# Patient Record
Sex: MALE | Race: WHITE | NOT HISPANIC OR LATINO | Employment: OTHER | URBAN - METROPOLITAN AREA
[De-identification: names, ages, dates, MRNs, and addresses within clinical notes are randomized per-mention and may not be internally consistent; named-entity substitution may affect disease eponyms.]

---

## 2023-08-23 LAB
ANION GAP IN SER/PLAS: 12 MMOL/L (ref 10–20)
CALCIUM (MG/DL) IN SER/PLAS: 9.3 MG/DL (ref 8.6–10.3)
CARBON DIOXIDE, TOTAL (MMOL/L) IN SER/PLAS: 29 MMOL/L (ref 21–32)
CHLORIDE (MMOL/L) IN SER/PLAS: 104 MMOL/L (ref 98–107)
CREATININE (MG/DL) IN SER/PLAS: 0.83 MG/DL (ref 0.5–1.3)
ERYTHROCYTE DISTRIBUTION WIDTH (RATIO) BY AUTOMATED COUNT: 12.2 % (ref 11.5–14.5)
ERYTHROCYTE MEAN CORPUSCULAR HEMOGLOBIN CONCENTRATION (G/DL) BY AUTOMATED: 34.4 G/DL (ref 32–36)
ERYTHROCYTE MEAN CORPUSCULAR VOLUME (FL) BY AUTOMATED COUNT: 94 FL (ref 80–100)
ERYTHROCYTES (10*6/UL) IN BLOOD BY AUTOMATED COUNT: 5.13 X10E12/L (ref 4.5–5.9)
GFR MALE: >90 ML/MIN/1.73M2
GLUCOSE (MG/DL) IN SER/PLAS: 77 MG/DL (ref 74–99)
HEMATOCRIT (%) IN BLOOD BY AUTOMATED COUNT: 48.3 % (ref 41–52)
HEMOGLOBIN (G/DL) IN BLOOD: 16.6 G/DL (ref 13.5–17.5)
LEUKOCYTES (10*3/UL) IN BLOOD BY AUTOMATED COUNT: 5.5 X10E9/L (ref 4.4–11.3)
PLATELETS (10*3/UL) IN BLOOD AUTOMATED COUNT: 151 X10E9/L (ref 150–450)
POTASSIUM (MMOL/L) IN SER/PLAS: 4.4 MMOL/L (ref 3.5–5.3)
SODIUM (MMOL/L) IN SER/PLAS: 141 MMOL/L (ref 136–145)
UREA NITROGEN (MG/DL) IN SER/PLAS: 16 MG/DL (ref 6–23)

## 2024-03-28 ENCOUNTER — OFFICE VISIT (OUTPATIENT)
Dept: CARDIOLOGY | Facility: CLINIC | Age: 74
End: 2024-03-28
Payer: MEDICARE

## 2024-03-28 VITALS
SYSTOLIC BLOOD PRESSURE: 142 MMHG | HEART RATE: 80 BPM | BODY MASS INDEX: 35.98 KG/M2 | DIASTOLIC BLOOD PRESSURE: 72 MMHG | WEIGHT: 258 LBS

## 2024-03-28 RX ORDER — MELOXICAM 15 MG/1
15 TABLET ORAL
COMMUNITY
Start: 2024-03-26

## 2024-03-28 RX ORDER — CARVEDILOL 3.12 MG/1
3.12 TABLET ORAL
COMMUNITY
Start: 2023-05-07

## 2024-03-28 RX ORDER — APIXABAN 5 MG/1
5 TABLET, FILM COATED ORAL 2 TIMES DAILY
COMMUNITY
Start: 2023-08-22

## 2024-03-28 RX ORDER — ALLOPURINOL 100 MG/1
200 TABLET ORAL DAILY
COMMUNITY
Start: 2024-03-22

## 2024-03-29 PROBLEM — J31.0 CHRONIC RHINITIS: Status: ACTIVE | Noted: 2017-06-27

## 2024-03-29 PROBLEM — E55.9 VITAMIN D DEFICIENCY: Status: ACTIVE | Noted: 2017-06-27

## 2024-03-29 PROBLEM — E66.811 CLASS 1 OBESITY: Status: ACTIVE | Noted: 2023-05-07

## 2024-03-29 PROBLEM — I82.412 ACUTE DEEP VEIN THROMBOSIS (DVT) OF FEMORAL VEIN OF LEFT LOWER EXTREMITY (MULTI): Status: ACTIVE | Noted: 2023-05-31

## 2024-03-29 PROBLEM — I26.09 ACUTE PULMONARY EMBOLISM WITH ACUTE COR PULMONALE (MULTI): Status: ACTIVE | Noted: 2023-05-31

## 2024-03-29 PROBLEM — I21.A1 MYOCARDIAL INFARCTION TYPE 2 (MULTI): Status: ACTIVE | Noted: 2023-05-07

## 2024-03-29 PROBLEM — E78.1 HYPERTRIGLYCERIDEMIA: Status: ACTIVE | Noted: 2017-06-27

## 2024-03-29 PROBLEM — E66.9 CLASS 1 OBESITY: Status: ACTIVE | Noted: 2023-05-07

## 2024-03-29 PROBLEM — F52.9 SEXUAL FUNCTION PROBLEM: Status: ACTIVE | Noted: 2017-06-27

## 2024-03-29 PROBLEM — D69.6 THROMBOCYTOPENIA, UNSPECIFIED (CMS-HCC): Status: ACTIVE | Noted: 2023-05-07

## 2024-03-29 PROBLEM — M75.112 PARTIAL TEAR OF LEFT ROTATOR CUFF: Status: ACTIVE | Noted: 2018-02-19

## 2024-03-29 PROBLEM — J40 BRONCHITIS: Status: ACTIVE | Noted: 2017-06-27

## 2024-09-17 DIAGNOSIS — I21.A1 MYOCARDIAL INFARCTION TYPE 2 (MULTI): Primary | ICD-10-CM

## 2024-09-19 ENCOUNTER — TELEPHONE (OUTPATIENT)
Dept: CARDIOLOGY | Facility: HOSPITAL | Age: 74
End: 2024-09-19
Payer: MEDICARE

## 2024-09-19 NOTE — TELEPHONE ENCOUNTER
Received a task that patient needs a yearly follow up with Dr. Ramos, got voicemail and left voicemail to call us back in regards to getting an appointment.

## 2024-09-23 ENCOUNTER — TELEPHONE (OUTPATIENT)
Dept: CARDIOLOGY | Facility: HOSPITAL | Age: 74
End: 2024-09-23
Payer: MEDICARE

## 2024-09-23 DIAGNOSIS — E78.1 HYPERTRIGLYCERIDEMIA: ICD-10-CM

## 2024-09-23 DIAGNOSIS — I21.A1 MYOCARDIAL INFARCTION TYPE 2 (MULTI): Primary | ICD-10-CM

## 2024-09-23 NOTE — TELEPHONE ENCOUNTER
RN called patient regarding lab work. Lab orders place, RN left message with patient.          ----- Message from Sameera Hermosillo sent at 9/19/2024  1:10 PM EDT -----  Catherine, I received refill request for apixaban.  No CBC or BMP since Aug 2023 - needs repeat labs.  I will send 30 days.  Please order lab work and notify patient (also needs f/u scheduled).    Gutierrez, last seen by Dr. Geronimo Ramos Aug 2023 and no follow up scheduled.  He needs f/u appt.

## 2025-05-23 ENCOUNTER — HOSPITAL ENCOUNTER (INPATIENT)
Facility: HOSPITAL | Age: 75
LOS: 2 days | Discharge: HOME | DRG: 322 | End: 2025-05-25
Attending: EMERGENCY MEDICINE | Admitting: INTERNAL MEDICINE
Payer: MEDICARE

## 2025-05-23 ENCOUNTER — APPOINTMENT (OUTPATIENT)
Dept: CARDIOLOGY | Facility: HOSPITAL | Age: 75
DRG: 322 | End: 2025-05-23
Payer: MEDICARE

## 2025-05-23 ENCOUNTER — APPOINTMENT (OUTPATIENT)
Dept: RADIOLOGY | Facility: HOSPITAL | Age: 75
DRG: 322 | End: 2025-05-23
Payer: MEDICARE

## 2025-05-23 ENCOUNTER — CLINICAL SUPPORT (OUTPATIENT)
Dept: EMERGENCY MEDICINE | Facility: HOSPITAL | Age: 75
DRG: 322 | End: 2025-05-23
Payer: MEDICARE

## 2025-05-23 DIAGNOSIS — I21.4 NSTEMI (NON-ST ELEVATED MYOCARDIAL INFARCTION) (MULTI): Primary | ICD-10-CM

## 2025-05-23 DIAGNOSIS — R79.89 OTHER SPECIFIED ABNORMAL FINDINGS OF BLOOD CHEMISTRY: ICD-10-CM

## 2025-05-23 DIAGNOSIS — M79.89 LEG SWELLING: ICD-10-CM

## 2025-05-23 DIAGNOSIS — R07.9 CHEST PAIN, UNSPECIFIED: ICD-10-CM

## 2025-05-23 DIAGNOSIS — I50.21 ACUTE SYSTOLIC HEART FAILURE: ICD-10-CM

## 2025-05-23 DIAGNOSIS — R06.02 SHORTNESS OF BREATH: ICD-10-CM

## 2025-05-23 LAB
ABO GROUP (TYPE) IN BLOOD: NORMAL
ALBUMIN SERPL BCP-MCNC: 3.7 G/DL (ref 3.4–5)
ALBUMIN SERPL BCP-MCNC: 4.2 G/DL (ref 3.4–5)
ALP SERPL-CCNC: 58 U/L (ref 33–136)
ALT SERPL W P-5'-P-CCNC: 32 U/L (ref 10–52)
ANION GAP SERPL CALC-SCNC: 14 MMOL/L (ref 10–20)
ANION GAP SERPL CALC-SCNC: 16 MMOL/L (ref 10–20)
ANTIBODY SCREEN: NORMAL
AORTIC VALVE MEAN GRADIENT: 7 MMHG
AORTIC VALVE PEAK VELOCITY: 1.72 M/S
APTT PPP: 33 SECONDS (ref 26–36)
APTT PPP: >200 SECONDS (ref 26–36)
AST SERPL W P-5'-P-CCNC: 52 U/L (ref 9–39)
ATRIAL RATE: 90 BPM
ATRIAL RATE: 91 BPM
AV PEAK GRADIENT: 12 MMHG
AVA (PEAK VEL): 2.16 CM2
AVA (VTI): 1.49 CM2
BASOPHILS # BLD AUTO: 0.05 X10*3/UL (ref 0–0.1)
BASOPHILS # BLD AUTO: 0.06 X10*3/UL (ref 0–0.1)
BASOPHILS NFR BLD AUTO: 0.6 %
BASOPHILS NFR BLD AUTO: 0.7 %
BILIRUB SERPL-MCNC: 1.2 MG/DL (ref 0–1.2)
BNP SERPL-MCNC: 142 PG/ML (ref 0–99)
BUN SERPL-MCNC: 16 MG/DL (ref 6–23)
BUN SERPL-MCNC: 17 MG/DL (ref 6–23)
CALCIUM SERPL-MCNC: 8.6 MG/DL (ref 8.6–10.6)
CALCIUM SERPL-MCNC: 9.2 MG/DL (ref 8.6–10.6)
CARDIAC TROPONIN I PNL SERPL HS: 7904 NG/L (ref 0–53)
CARDIAC TROPONIN I PNL SERPL HS: 8378 NG/L (ref 0–53)
CHLORIDE SERPL-SCNC: 101 MMOL/L (ref 98–107)
CHLORIDE SERPL-SCNC: 102 MMOL/L (ref 98–107)
CO2 SERPL-SCNC: 24 MMOL/L (ref 21–32)
CO2 SERPL-SCNC: 24 MMOL/L (ref 21–32)
CREAT SERPL-MCNC: 1 MG/DL (ref 0.5–1.3)
CREAT SERPL-MCNC: 1.13 MG/DL (ref 0.5–1.3)
D DIMER PPP FEU-MCNC: 897 NG/ML FEU
EGFRCR SERPLBLD CKD-EPI 2021: 68 ML/MIN/1.73M*2
EGFRCR SERPLBLD CKD-EPI 2021: 78 ML/MIN/1.73M*2
EJECTION FRACTION APICAL 4 CHAMBER: 31.4
EJECTION FRACTION: 33 %
EOSINOPHIL # BLD AUTO: 0.04 X10*3/UL (ref 0–0.4)
EOSINOPHIL # BLD AUTO: 0.05 X10*3/UL (ref 0–0.4)
EOSINOPHIL NFR BLD AUTO: 0.5 %
EOSINOPHIL NFR BLD AUTO: 0.6 %
ERYTHROCYTE [DISTWIDTH] IN BLOOD BY AUTOMATED COUNT: 11.4 % (ref 11.5–14.5)
ERYTHROCYTE [DISTWIDTH] IN BLOOD BY AUTOMATED COUNT: 11.7 % (ref 11.5–14.5)
GLUCOSE SERPL-MCNC: 77 MG/DL (ref 74–99)
GLUCOSE SERPL-MCNC: 90 MG/DL (ref 74–99)
HCT VFR BLD AUTO: 42.6 % (ref 41–52)
HCT VFR BLD AUTO: 45.1 % (ref 41–52)
HGB BLD-MCNC: 15.1 G/DL (ref 13.5–17.5)
HGB BLD-MCNC: 16.1 G/DL (ref 13.5–17.5)
IMM GRANULOCYTES # BLD AUTO: 0.04 X10*3/UL (ref 0–0.5)
IMM GRANULOCYTES # BLD AUTO: 0.08 X10*3/UL (ref 0–0.5)
IMM GRANULOCYTES NFR BLD AUTO: 0.5 % (ref 0–0.9)
IMM GRANULOCYTES NFR BLD AUTO: 1 % (ref 0–0.9)
INR PPP: 1.5 (ref 0.9–1.1)
LACTATE SERPL-SCNC: 1.5 MMOL/L (ref 0.4–2)
LEFT ATRIUM VOLUME AREA LENGTH INDEX BSA: 16 ML/M2
LEFT VENTRICLE INTERNAL DIMENSION DIASTOLE: 5.27 CM (ref 3.5–6)
LEFT VENTRICULAR OUTFLOW TRACT DIAMETER: 2.24 CM
LYMPHOCYTES # BLD AUTO: 1.89 X10*3/UL (ref 0.8–3)
LYMPHOCYTES # BLD AUTO: 2.3 X10*3/UL (ref 0.8–3)
LYMPHOCYTES NFR BLD AUTO: 22.5 %
LYMPHOCYTES NFR BLD AUTO: 28.8 %
MAGNESIUM SERPL-MCNC: 1.95 MG/DL (ref 1.6–2.4)
MAGNESIUM SERPL-MCNC: 2.19 MG/DL (ref 1.6–2.4)
MCH RBC QN AUTO: 32.2 PG (ref 26–34)
MCH RBC QN AUTO: 32.8 PG (ref 26–34)
MCHC RBC AUTO-ENTMCNC: 35.4 G/DL (ref 32–36)
MCHC RBC AUTO-ENTMCNC: 35.7 G/DL (ref 32–36)
MCV RBC AUTO: 90 FL (ref 80–100)
MCV RBC AUTO: 93 FL (ref 80–100)
MITRAL VALVE E/A RATIO: 0.67
MONOCYTES # BLD AUTO: 0.74 X10*3/UL (ref 0.05–0.8)
MONOCYTES # BLD AUTO: 0.8 X10*3/UL (ref 0.05–0.8)
MONOCYTES NFR BLD AUTO: 9.3 %
MONOCYTES NFR BLD AUTO: 9.5 %
NEUTROPHILS # BLD AUTO: 4.78 X10*3/UL (ref 1.6–5.5)
NEUTROPHILS # BLD AUTO: 5.58 X10*3/UL (ref 1.6–5.5)
NEUTROPHILS NFR BLD AUTO: 59.7 %
NEUTROPHILS NFR BLD AUTO: 66.3 %
NRBC BLD-RTO: 0 /100 WBCS (ref 0–0)
NRBC BLD-RTO: 0 /100 WBCS (ref 0–0)
P AXIS: 39 DEGREES
P AXIS: 49 DEGREES
P OFFSET: 180 MS
P OFFSET: 185 MS
P ONSET: 138 MS
P ONSET: 142 MS
PHOSPHATE SERPL-MCNC: 3.4 MG/DL (ref 2.5–4.9)
PLATELET # BLD AUTO: 170 X10*3/UL (ref 150–450)
PLATELET # BLD AUTO: 170 X10*3/UL (ref 150–450)
POTASSIUM SERPL-SCNC: 4 MMOL/L (ref 3.5–5.3)
POTASSIUM SERPL-SCNC: 4.3 MMOL/L (ref 3.5–5.3)
PR INTERVAL: 144 MS
PR INTERVAL: 150 MS
PROT SERPL-MCNC: 7.2 G/DL (ref 6.4–8.2)
PROTHROMBIN TIME: 16.6 SECONDS (ref 9.8–12.4)
Q ONSET: 213 MS
Q ONSET: 214 MS
QRS COUNT: 15 BEATS
QRS COUNT: 15 BEATS
QRS DURATION: 88 MS
QRS DURATION: 88 MS
QT INTERVAL: 360 MS
QT INTERVAL: 370 MS
QTC CALCULATION(BAZETT): 440 MS
QTC CALCULATION(BAZETT): 455 MS
QTC FREDERICIA: 412 MS
QTC FREDERICIA: 425 MS
R AXIS: -7 DEGREES
R AXIS: 6 DEGREES
RBC # BLD AUTO: 4.6 X10*6/UL (ref 4.5–5.9)
RBC # BLD AUTO: 5 X10*6/UL (ref 4.5–5.9)
RH FACTOR (ANTIGEN D): NORMAL
RIGHT VENTRICLE FREE WALL PEAK S': 7 CM/S
RIGHT VENTRICLE PEAK SYSTOLIC PRESSURE: 23.9 MMHG
SODIUM SERPL-SCNC: 136 MMOL/L (ref 136–145)
SODIUM SERPL-SCNC: 137 MMOL/L (ref 136–145)
T AXIS: -2 DEGREES
T AXIS: -30 DEGREES
T OFFSET: 393 MS
T OFFSET: 399 MS
TRICUSPID ANNULAR PLANE SYSTOLIC EXCURSION: 1.8 CM
UFH PPP CHRO-ACNC: 0.7 IU/ML (ref ?–1.1)
UFH PPP CHRO-ACNC: 1.8 IU/ML (ref ?–1.1)
VENTRICULAR RATE: 90 BPM
VENTRICULAR RATE: 91 BPM
WBC # BLD AUTO: 8 X10*3/UL (ref 4.4–11.3)
WBC # BLD AUTO: 8.4 X10*3/UL (ref 4.4–11.3)

## 2025-05-23 PROCEDURE — C1894 INTRO/SHEATH, NON-LASER: HCPCS | Performed by: INTERNAL MEDICINE

## 2025-05-23 PROCEDURE — 2500000004 HC RX 250 GENERAL PHARMACY W/ HCPCS (ALT 636 FOR OP/ED): Mod: JZ

## 2025-05-23 PROCEDURE — 93458 L HRT ARTERY/VENTRICLE ANGIO: CPT | Performed by: INTERNAL MEDICINE

## 2025-05-23 PROCEDURE — 93308 TTE F-UP OR LMTD: CPT | Performed by: EMERGENCY MEDICINE

## 2025-05-23 PROCEDURE — 2500000004 HC RX 250 GENERAL PHARMACY W/ HCPCS (ALT 636 FOR OP/ED)

## 2025-05-23 PROCEDURE — 02703DZ DILATION OF CORONARY ARTERY, ONE ARTERY WITH INTRALUMINAL DEVICE, PERCUTANEOUS APPROACH: ICD-10-PCS | Performed by: INTERNAL MEDICINE

## 2025-05-23 PROCEDURE — 027035Z DILATION OF CORONARY ARTERY, ONE ARTERY WITH TWO DRUG-ELUTING INTRALUMINAL DEVICES, PERCUTANEOUS APPROACH: ICD-10-PCS | Performed by: INTERNAL MEDICINE

## 2025-05-23 PROCEDURE — 83605 ASSAY OF LACTIC ACID: CPT

## 2025-05-23 PROCEDURE — 92978 ENDOLUMINL IVUS OCT C 1ST: CPT | Performed by: INTERNAL MEDICINE

## 2025-05-23 PROCEDURE — 86901 BLOOD TYPING SEROLOGIC RH(D): CPT

## 2025-05-23 PROCEDURE — 83735 ASSAY OF MAGNESIUM: CPT

## 2025-05-23 PROCEDURE — 85730 THROMBOPLASTIN TIME PARTIAL: CPT

## 2025-05-23 PROCEDURE — 99285 EMERGENCY DEPT VISIT HI MDM: CPT | Performed by: EMERGENCY MEDICINE

## 2025-05-23 PROCEDURE — C1887 CATHETER, GUIDING: HCPCS | Performed by: INTERNAL MEDICINE

## 2025-05-23 PROCEDURE — 99285 EMERGENCY DEPT VISIT HI MDM: CPT | Mod: 25 | Performed by: EMERGENCY MEDICINE

## 2025-05-23 PROCEDURE — 2500000002 HC RX 250 W HCPCS SELF ADMINISTERED DRUGS (ALT 637 FOR MEDICARE OP, ALT 636 FOR OP/ED): Performed by: INTERNAL MEDICINE

## 2025-05-23 PROCEDURE — 74018 RADEX ABDOMEN 1 VIEW: CPT

## 2025-05-23 PROCEDURE — 80053 COMPREHEN METABOLIC PANEL: CPT

## 2025-05-23 PROCEDURE — 85520 HEPARIN ASSAY: CPT

## 2025-05-23 PROCEDURE — 85025 COMPLETE CBC W/AUTO DIFF WBC: CPT

## 2025-05-23 PROCEDURE — 85610 PROTHROMBIN TIME: CPT

## 2025-05-23 PROCEDURE — 85347 COAGULATION TIME ACTIVATED: CPT | Performed by: INTERNAL MEDICINE

## 2025-05-23 PROCEDURE — C1874 STENT, COATED/COV W/DEL SYS: HCPCS | Performed by: INTERNAL MEDICINE

## 2025-05-23 PROCEDURE — 93306 TTE W/DOPPLER COMPLETE: CPT | Performed by: INTERNAL MEDICINE

## 2025-05-23 PROCEDURE — 92928 PRQ TCAT PLMT NTRAC ST 1 LES: CPT | Performed by: INTERNAL MEDICINE

## 2025-05-23 PROCEDURE — 4A023N7 MEASUREMENT OF CARDIAC SAMPLING AND PRESSURE, LEFT HEART, PERCUTANEOUS APPROACH: ICD-10-PCS | Performed by: INTERNAL MEDICINE

## 2025-05-23 PROCEDURE — C1760 CLOSURE DEV, VASC: HCPCS | Performed by: INTERNAL MEDICINE

## 2025-05-23 PROCEDURE — 83880 ASSAY OF NATRIURETIC PEPTIDE: CPT

## 2025-05-23 PROCEDURE — 2720000007 HC OR 272 NO HCPCS: Performed by: INTERNAL MEDICINE

## 2025-05-23 PROCEDURE — 85379 FIBRIN DEGRADATION QUANT: CPT

## 2025-05-23 PROCEDURE — 80069 RENAL FUNCTION PANEL: CPT | Mod: CCI

## 2025-05-23 PROCEDURE — 71046 X-RAY EXAM CHEST 2 VIEWS: CPT | Performed by: RADIOLOGY

## 2025-05-23 PROCEDURE — 2780000003 HC OR 278 NO HCPCS: Performed by: INTERNAL MEDICINE

## 2025-05-23 PROCEDURE — 93005 ELECTROCARDIOGRAM TRACING: CPT

## 2025-05-23 PROCEDURE — 74018 RADEX ABDOMEN 1 VIEW: CPT | Performed by: RADIOLOGY

## 2025-05-23 PROCEDURE — 2500000001 HC RX 250 WO HCPCS SELF ADMINISTERED DRUGS (ALT 637 FOR MEDICARE OP)

## 2025-05-23 PROCEDURE — 99223 1ST HOSP IP/OBS HIGH 75: CPT

## 2025-05-23 PROCEDURE — 84484 ASSAY OF TROPONIN QUANT: CPT

## 2025-05-23 PROCEDURE — 36415 COLL VENOUS BLD VENIPUNCTURE: CPT

## 2025-05-23 PROCEDURE — 2500000002 HC RX 250 W HCPCS SELF ADMINISTERED DRUGS (ALT 637 FOR MEDICARE OP, ALT 636 FOR OP/ED)

## 2025-05-23 PROCEDURE — 92920 PRQ TRLUML C ANGIOP 1ART&/BR: CPT | Performed by: INTERNAL MEDICINE

## 2025-05-23 PROCEDURE — 71046 X-RAY EXAM CHEST 2 VIEWS: CPT

## 2025-05-23 PROCEDURE — C1725 CATH, TRANSLUMIN NON-LASER: HCPCS | Performed by: INTERNAL MEDICINE

## 2025-05-23 PROCEDURE — B2111ZZ FLUOROSCOPY OF MULTIPLE CORONARY ARTERIES USING LOW OSMOLAR CONTRAST: ICD-10-PCS | Performed by: INTERNAL MEDICINE

## 2025-05-23 PROCEDURE — 93010 ELECTROCARDIOGRAM REPORT: CPT | Performed by: EMERGENCY MEDICINE

## 2025-05-23 PROCEDURE — 99152 MOD SED SAME PHYS/QHP 5/>YRS: CPT | Performed by: INTERNAL MEDICINE

## 2025-05-23 PROCEDURE — 2550000001 HC RX 255 CONTRASTS: Performed by: INTERNAL MEDICINE

## 2025-05-23 PROCEDURE — 96374 THER/PROPH/DIAG INJ IV PUSH: CPT | Mod: 59

## 2025-05-23 PROCEDURE — 2500000004 HC RX 250 GENERAL PHARMACY W/ HCPCS (ALT 636 FOR OP/ED): Performed by: INTERNAL MEDICINE

## 2025-05-23 PROCEDURE — C1769 GUIDE WIRE: HCPCS | Performed by: INTERNAL MEDICINE

## 2025-05-23 PROCEDURE — 99153 MOD SED SAME PHYS/QHP EA: CPT | Performed by: INTERNAL MEDICINE

## 2025-05-23 PROCEDURE — C1753 CATH, INTRAVAS ULTRASOUND: HCPCS | Performed by: INTERNAL MEDICINE

## 2025-05-23 PROCEDURE — 2020000001 HC ICU ROOM DAILY

## 2025-05-23 PROCEDURE — 93306 TTE W/DOPPLER COMPLETE: CPT

## 2025-05-23 DEVICE — IMPLANTABLE DEVICE: Type: IMPLANTABLE DEVICE | Site: CORONARY | Status: FUNCTIONAL

## 2025-05-23 DEVICE — EVEROLIMUS-ELUTING PLATINUM CHROMIUM CORONARY STENT SYSTEM
Type: IMPLANTABLE DEVICE | Site: CORONARY | Status: FUNCTIONAL
Brand: SYNERGY™ XD

## 2025-05-23 RX ORDER — TICAGRELOR 90 MG/1
90 TABLET, FILM COATED ORAL 2 TIMES DAILY
Status: DISCONTINUED | OUTPATIENT
Start: 2025-05-23 | End: 2025-05-25 | Stop reason: HOSPADM

## 2025-05-23 RX ORDER — HEPARIN SODIUM 1000 [USP'U]/ML
INJECTION, SOLUTION INTRAVENOUS; SUBCUTANEOUS AS NEEDED
Status: DISCONTINUED | OUTPATIENT
Start: 2025-05-23 | End: 2025-05-23 | Stop reason: HOSPADM

## 2025-05-23 RX ORDER — FENTANYL CITRATE 50 UG/ML
INJECTION, SOLUTION INTRAMUSCULAR; INTRAVENOUS AS NEEDED
Status: DISCONTINUED | OUTPATIENT
Start: 2025-05-23 | End: 2025-05-23 | Stop reason: HOSPADM

## 2025-05-23 RX ORDER — TICAGRELOR 90 MG/1
TABLET, FILM COATED ORAL AS NEEDED
Status: DISCONTINUED | OUTPATIENT
Start: 2025-05-23 | End: 2025-05-23 | Stop reason: HOSPADM

## 2025-05-23 RX ORDER — MIDAZOLAM HYDROCHLORIDE 1 MG/ML
INJECTION, SOLUTION INTRAMUSCULAR; INTRAVENOUS AS NEEDED
Status: DISCONTINUED | OUTPATIENT
Start: 2025-05-23 | End: 2025-05-23 | Stop reason: HOSPADM

## 2025-05-23 RX ORDER — LIDOCAINE HYDROCHLORIDE 20 MG/ML
INJECTION, SOLUTION INFILTRATION; PERINEURAL AS NEEDED
Status: DISCONTINUED | OUTPATIENT
Start: 2025-05-23 | End: 2025-05-23 | Stop reason: HOSPADM

## 2025-05-23 RX ORDER — HEPARIN SODIUM 10000 [USP'U]/100ML
0-4000 INJECTION, SOLUTION INTRAVENOUS CONTINUOUS
Status: DISCONTINUED | OUTPATIENT
Start: 2025-05-23 | End: 2025-05-24

## 2025-05-23 RX ORDER — VERAPAMIL HYDROCHLORIDE 2.5 MG/ML
INJECTION INTRAVENOUS AS NEEDED
Status: DISCONTINUED | OUTPATIENT
Start: 2025-05-23 | End: 2025-05-23 | Stop reason: HOSPADM

## 2025-05-23 RX ORDER — NAPROXEN SODIUM 220 MG/1
325 TABLET, FILM COATED ORAL DAILY
Status: DISCONTINUED | OUTPATIENT
Start: 2025-05-23 | End: 2025-05-23

## 2025-05-23 RX ORDER — ONDANSETRON HYDROCHLORIDE 2 MG/ML
4 INJECTION, SOLUTION INTRAVENOUS ONCE
Status: COMPLETED | OUTPATIENT
Start: 2025-05-23 | End: 2025-05-23

## 2025-05-23 RX ORDER — ONDANSETRON HYDROCHLORIDE 2 MG/ML
INJECTION, SOLUTION INTRAVENOUS
Status: COMPLETED
Start: 2025-05-23 | End: 2025-05-23

## 2025-05-23 RX ORDER — PROCHLORPERAZINE EDISYLATE 5 MG/ML
10 INJECTION INTRAMUSCULAR; INTRAVENOUS ONCE
Status: COMPLETED | OUTPATIENT
Start: 2025-05-23 | End: 2025-05-23

## 2025-05-23 RX ORDER — NAPROXEN SODIUM 220 MG/1
81 TABLET, FILM COATED ORAL DAILY
Status: DISCONTINUED | OUTPATIENT
Start: 2025-05-24 | End: 2025-05-25 | Stop reason: HOSPADM

## 2025-05-23 RX ORDER — NITROGLYCERIN 40 MG/100ML
INJECTION INTRAVENOUS AS NEEDED
Status: DISCONTINUED | OUTPATIENT
Start: 2025-05-23 | End: 2025-05-23 | Stop reason: HOSPADM

## 2025-05-23 RX ORDER — ATORVASTATIN CALCIUM 40 MG/1
40 TABLET, FILM COATED ORAL NIGHTLY
Status: DISCONTINUED | OUTPATIENT
Start: 2025-05-23 | End: 2025-05-25

## 2025-05-23 RX ADMIN — TICAGRELOR 90 MG: 90 TABLET ORAL at 22:00

## 2025-05-23 RX ADMIN — HEPARIN SODIUM 1000 UNITS/HR: 10000 INJECTION, SOLUTION INTRAVENOUS at 12:24

## 2025-05-23 RX ADMIN — HEPARIN SODIUM 1000 UNITS/HR: 10000 INJECTION, SOLUTION INTRAVENOUS at 23:36

## 2025-05-23 RX ADMIN — ONDANSETRON HYDROCHLORIDE 4 MG: 2 INJECTION, SOLUTION INTRAVENOUS at 18:35

## 2025-05-23 RX ADMIN — PROCHLORPERAZINE EDISYLATE 10 MG: 5 INJECTION INTRAMUSCULAR; INTRAVENOUS at 20:47

## 2025-05-23 RX ADMIN — ASPIRIN 324 MG: 81 TABLET, CHEWABLE ORAL at 11:49

## 2025-05-23 RX ADMIN — ONDANSETRON 4 MG: 2 INJECTION INTRAMUSCULAR; INTRAVENOUS at 18:35

## 2025-05-23 RX ADMIN — ATORVASTATIN CALCIUM 40 MG: 40 TABLET, FILM COATED ORAL at 22:00

## 2025-05-23 SDOH — SOCIAL STABILITY: SOCIAL INSECURITY: ABUSE: ADULT

## 2025-05-23 SDOH — ECONOMIC STABILITY: HOUSING INSECURITY: IN THE LAST 12 MONTHS, WAS THERE A TIME WHEN YOU WERE NOT ABLE TO PAY THE MORTGAGE OR RENT ON TIME?: NO

## 2025-05-23 SDOH — ECONOMIC STABILITY: TRANSPORTATION INSECURITY: IN THE PAST 12 MONTHS, HAS LACK OF TRANSPORTATION KEPT YOU FROM MEDICAL APPOINTMENTS OR FROM GETTING MEDICATIONS?: NO

## 2025-05-23 SDOH — ECONOMIC STABILITY: FOOD INSECURITY: WITHIN THE PAST 12 MONTHS, THE FOOD YOU BOUGHT JUST DIDN'T LAST AND YOU DIDN'T HAVE MONEY TO GET MORE.: NEVER TRUE

## 2025-05-23 SDOH — SOCIAL STABILITY: SOCIAL INSECURITY
WITHIN THE LAST YEAR, HAVE YOU BEEN KICKED, HIT, SLAPPED, OR OTHERWISE PHYSICALLY HURT BY YOUR PARTNER OR EX-PARTNER?: NO

## 2025-05-23 SDOH — SOCIAL STABILITY: SOCIAL INSECURITY: WITHIN THE LAST YEAR, HAVE YOU BEEN HUMILIATED OR EMOTIONALLY ABUSED IN OTHER WAYS BY YOUR PARTNER OR EX-PARTNER?: NO

## 2025-05-23 SDOH — ECONOMIC STABILITY: FOOD INSECURITY: WITHIN THE PAST 12 MONTHS, YOU WORRIED THAT YOUR FOOD WOULD RUN OUT BEFORE YOU GOT THE MONEY TO BUY MORE.: NEVER TRUE

## 2025-05-23 SDOH — ECONOMIC STABILITY: INCOME INSECURITY: IN THE PAST 12 MONTHS HAS THE ELECTRIC, GAS, OIL, OR WATER COMPANY THREATENED TO SHUT OFF SERVICES IN YOUR HOME?: NO

## 2025-05-23 SDOH — SOCIAL STABILITY: SOCIAL INSECURITY: WITHIN THE LAST YEAR, HAVE YOU BEEN AFRAID OF YOUR PARTNER OR EX-PARTNER?: NO

## 2025-05-23 SDOH — ECONOMIC STABILITY: HOUSING INSECURITY: AT ANY TIME IN THE PAST 12 MONTHS, WERE YOU HOMELESS OR LIVING IN A SHELTER (INCLUDING NOW)?: NO

## 2025-05-23 SDOH — SOCIAL STABILITY: SOCIAL INSECURITY: WERE YOU ABLE TO COMPLETE ALL THE BEHAVIORAL HEALTH SCREENINGS?: YES

## 2025-05-23 SDOH — SOCIAL STABILITY: SOCIAL INSECURITY: HAVE YOU HAD THOUGHTS OF HARMING ANYONE ELSE?: NO

## 2025-05-23 SDOH — SOCIAL STABILITY: SOCIAL INSECURITY
WITHIN THE LAST YEAR, HAVE YOU BEEN RAPED OR FORCED TO HAVE ANY KIND OF SEXUAL ACTIVITY BY YOUR PARTNER OR EX-PARTNER?: NO

## 2025-05-23 SDOH — SOCIAL STABILITY: SOCIAL INSECURITY: DO YOU FEEL ANYONE HAS EXPLOITED OR TAKEN ADVANTAGE OF YOU FINANCIALLY OR OF YOUR PERSONAL PROPERTY?: NO

## 2025-05-23 SDOH — SOCIAL STABILITY: SOCIAL INSECURITY: ARE YOU OR HAVE YOU BEEN THREATENED OR ABUSED PHYSICALLY, EMOTIONALLY, OR SEXUALLY BY ANYONE?: NO

## 2025-05-23 SDOH — SOCIAL STABILITY: SOCIAL INSECURITY: DOES ANYONE TRY TO KEEP YOU FROM HAVING/CONTACTING OTHER FRIENDS OR DOING THINGS OUTSIDE YOUR HOME?: NO

## 2025-05-23 SDOH — SOCIAL STABILITY: SOCIAL INSECURITY: HAS ANYONE EVER THREATENED TO HURT YOUR FAMILY OR YOUR PETS?: NO

## 2025-05-23 SDOH — ECONOMIC STABILITY: FOOD INSECURITY: HOW HARD IS IT FOR YOU TO PAY FOR THE VERY BASICS LIKE FOOD, HOUSING, MEDICAL CARE, AND HEATING?: NOT HARD AT ALL

## 2025-05-23 SDOH — SOCIAL STABILITY: SOCIAL INSECURITY: HAVE YOU HAD ANY THOUGHTS OF HARMING ANYONE ELSE?: NO

## 2025-05-23 SDOH — SOCIAL STABILITY: SOCIAL INSECURITY: DO YOU FEEL UNSAFE GOING BACK TO THE PLACE WHERE YOU ARE LIVING?: NO

## 2025-05-23 SDOH — SOCIAL STABILITY: SOCIAL INSECURITY: ARE THERE ANY APPARENT SIGNS OF INJURIES/BEHAVIORS THAT COULD BE RELATED TO ABUSE/NEGLECT?: NO

## 2025-05-23 ASSESSMENT — ENCOUNTER SYMPTOMS
FATIGUE: 1
SHORTNESS OF BREATH: 1
WOUND: 0
HEADACHES: 0
SINUS PAIN: 0
COUGH: 1
HEMATURIA: 0
LIGHT-HEADEDNESS: 0
DYSURIA: 0
UNEXPECTED WEIGHT CHANGE: 0
DIZZINESS: 0
CONSTIPATION: 0
RHINORRHEA: 0
DIARRHEA: 0
ABDOMINAL PAIN: 0

## 2025-05-23 ASSESSMENT — ACTIVITIES OF DAILY LIVING (ADL)
FEEDING YOURSELF: INDEPENDENT
WALKS IN HOME: INDEPENDENT
HEARING - LEFT EAR: FUNCTIONAL
HEARING - RIGHT EAR: FUNCTIONAL
DRESSING YOURSELF: INDEPENDENT
LACK_OF_TRANSPORTATION: NO
BATHING: INDEPENDENT
JUDGMENT_ADEQUATE_SAFELY_COMPLETE_DAILY_ACTIVITIES: YES
GROOMING: INDEPENDENT
ADEQUATE_TO_COMPLETE_ADL: YES
TOILETING: INDEPENDENT
PATIENT'S MEMORY ADEQUATE TO SAFELY COMPLETE DAILY ACTIVITIES?: YES

## 2025-05-23 ASSESSMENT — LIFESTYLE VARIABLES
HOW MANY STANDARD DRINKS CONTAINING ALCOHOL DO YOU HAVE ON A TYPICAL DAY: PATIENT DOES NOT DRINK
HOW OFTEN DO YOU HAVE A DRINK CONTAINING ALCOHOL: NEVER
TOTAL SCORE: 0
SUBSTANCE_ABUSE_PAST_12_MONTHS: NO
PRESCIPTION_ABUSE_PAST_12_MONTHS: NO
HAVE PEOPLE ANNOYED YOU BY CRITICIZING YOUR DRINKING: NO
EVER FELT BAD OR GUILTY ABOUT YOUR DRINKING: NO
HOW OFTEN DO YOU HAVE 6 OR MORE DRINKS ON ONE OCCASION: NEVER
EVER HAD A DRINK FIRST THING IN THE MORNING TO STEADY YOUR NERVES TO GET RID OF A HANGOVER: NO
SKIP TO QUESTIONS 9-10: 1
HAVE YOU EVER FELT YOU SHOULD CUT DOWN ON YOUR DRINKING: NO
AUDIT-C TOTAL SCORE: 0
AUDIT-C TOTAL SCORE: 0

## 2025-05-23 ASSESSMENT — COGNITIVE AND FUNCTIONAL STATUS - GENERAL
DAILY ACTIVITIY SCORE: 24
WALKING IN HOSPITAL ROOM: A LITTLE
MOBILITY SCORE: 22
CLIMB 3 TO 5 STEPS WITH RAILING: A LITTLE
PATIENT BASELINE BEDBOUND: NO

## 2025-05-23 ASSESSMENT — PAIN - FUNCTIONAL ASSESSMENT
PAIN_FUNCTIONAL_ASSESSMENT: 0-10

## 2025-05-23 ASSESSMENT — COLUMBIA-SUICIDE SEVERITY RATING SCALE - C-SSRS
1. IN THE PAST MONTH, HAVE YOU WISHED YOU WERE DEAD OR WISHED YOU COULD GO TO SLEEP AND NOT WAKE UP?: NO
6. HAVE YOU EVER DONE ANYTHING, STARTED TO DO ANYTHING, OR PREPARED TO DO ANYTHING TO END YOUR LIFE?: NO
2. HAVE YOU ACTUALLY HAD ANY THOUGHTS OF KILLING YOURSELF?: NO

## 2025-05-23 ASSESSMENT — PAIN DESCRIPTION - LOCATION: LOCATION: CHEST

## 2025-05-23 ASSESSMENT — PAIN SCALES - GENERAL
PAINLEVEL_OUTOF10: 0 - NO PAIN
PAINLEVEL_OUTOF10: 8
PAINLEVEL_OUTOF10: 0 - NO PAIN
PAINLEVEL_OUTOF10: 6

## 2025-05-23 ASSESSMENT — PATIENT HEALTH QUESTIONNAIRE - PHQ9
SUM OF ALL RESPONSES TO PHQ9 QUESTIONS 1 & 2: 0
2. FEELING DOWN, DEPRESSED OR HOPELESS: NOT AT ALL
1. LITTLE INTEREST OR PLEASURE IN DOING THINGS: NOT AT ALL

## 2025-05-23 ASSESSMENT — PAIN DESCRIPTION - DESCRIPTORS: DESCRIPTORS: ACHING

## 2025-05-23 NOTE — Clinical Note
Angioplasty of the proximal right coronary artery lesion. Inflation 1: Pressure = 16 sal; Duration = 13 sec.

## 2025-05-23 NOTE — Clinical Note
Diagnostic wire inserted. pt arrive by ambulance with sudden onset of left sided CP and heaviness that began after drinking water and choking on it. EMS administer 1 nitro SL, ASA 324mg.

## 2025-05-23 NOTE — POST-PROCEDURE NOTE
Physician Transition of Care Summary  Invasive Cardiovascular Lab    Procedure Date: 5/23/2025  Attending:    * Luis Hurst - Primary  Resident/Fellow/Other Assistant: Surgeons and Role:     * Renny Yepez MD - Fellow    Indications:   Pre-op Diagnosis      * NSTEMI (non-ST elevated myocardial infarction) (Multi) [I21.4]    Post-procedure diagnosis:   Post-op Diagnosis     * NSTEMI (non-ST elevated myocardial infarction) (Multi) [I21.4]    Procedure(s):   [facility], With LV  [id number] - [place] CATH PLMT L HRT & ARTS W/NJX & ANGIO IMG S&I        Procedure Findings:     Coronary Angiography:  The coronary circulation is right dominant.     Left Main Coronary Artery:   The left main coronary artery is a normal caliber vessel. The left main arises normally from the left coronary sinus of Valsalva and bifurcates into the LAD and circumflex coronary arteries. The left main coronary artery showed no significant disease or stenosis greater than 30%.     Left Anterior Descending Coronary Artery Distribution:   The left anterior descending coronary artery is a normal caliber vessel. The LAD arises normally from the left main coronary artery. The LAD demonstrated no significant disease or stenosis greater than 30%. The 1st diagonal branch is a large caliber vessel. The 1st diagonal branch showed severe atherosclerotic disease. There is a 90% stenosis of in a branch of the diagonal.     Circumflex Coronary Artery Distribution:   The circumflex coronary artery is a normal caliber vessel. The circumflex arises normally from the left main coronary artery and terminates in the AV groove. The circumflex revealed mild luminal irregularities and mild atherosclerotic disease. The 1st obtuse marginal branch is a normal caliber vessel. The 1st obtuse marginal branch showed no significant disease or stenosis greater than 30%.     Right Coronary Artery Distribution:   The right coronary artery is a normal caliber vessel. The RCA arises normally from  the right sinus of Valsalva. The RCA showed total thrombotic occlusion originating at the proximal to mid segment.     Coronary Interventions:  Angiography reveals a 100% stenosis of the proximal to mid right coronary artery coronary artery. Pre-intervention MARISOL flow was 0. Intravascular Ultrasound (IVUS) was performed within the proximal to mid right coronary artery. Percutaneous coronary intervention was performed within the proximal to mid right coronary artery. The vessel was pre-dilated using a compliant balloon 2.5 mm x 20 mm at 8 YVONNE. synergy Xd 4.0 mm x 48 mm was advanced to the lesion and implanted at 18 YVONNE. A second Synergy Xd 4.5 mm x 12 mm was implanted in overlap at 18 YVONNE. The stenosis was successfully reduced from 100% to 0%. Post-intervention MARISOL flow was 3.     Description of the Procedure:   After infiltration with 2% Lidocaine, the right radial artery was cannulated with a modified Seldinger technique. Subsequently a 6 Monegasque sheath was placed in the right radial artery. Multiple injections of contrast were made into the left and right coronary arteries with angiograms recorded in multiple projections. After completion of the procedure, the arterial sheath was pulled and a TR Band Radial Compression Device was utilized to obtain patent hemostasis.       Complications:   None    Stents/Implants:   Implants          Stent          Stent, Synergy Xd, Mr Us, 4.00 X 48mm - [id number] - Implanted        Inventory item: STENT, SYNERGY XD, MR US, 4.00 X 48MM Model/Cat number: [id number]    : [employer] Lot number: [id number]    Device identifier: [id number]        GUDID Information       Request status Successful        Brand name: SYNERGY™ XD Version/Model: [id number]    Company name: [employer] MRI safety info as of 5/23/25: MR Sunny    Contains dry or latex rubber: No      GMDN P.T. name: Drug-eluting coronary artery stent,  non-bioabsorbable-polymer-coated                As of 5/23/2025       Status: Implanted                        Stent, Synergy XMr seb Us, 4.50 X 12mm - [id number] - Implanted        Inventory item: STENT, SYNERGY XMR SANDY BRIONES, 4.50 X 12MM Model/Cat number: [id number]    : Delivered Lot number: [id number]      As of 5/23/2025       Status: Implanted                              Anticoagulation/Antiplatelet Plan:   DAPT (ASA and brilinta) and heparin gtt    Estimated Blood Loss:   10 mL    Anesthesia: Moderate Sedation Anesthesia Staff: No anesthesia staff entered.    Any Specimen(s) Removed:   No specimens collected during this procedure.    Disposition:   CICU      Electronically signed by: Renny Yepez MD, 5/23/2025 5:55 PM

## 2025-05-23 NOTE — Clinical Note
Angioplasty of the right coronary artery lesion. Inflation 1: Pressure = 8 sal; Duration = 5 sec. Inflation 2: Pressure = 8 sal; Duration = 10 sec. Inflation 3: Pressure = 8 sal; Duration = 10 sec. Inflation 4: Pressure = 8 sal; Duration = 15 sec.

## 2025-05-23 NOTE — Clinical Note
Angioplasty of the proximal right coronary artery lesion. Inflation 1: Pressure = 18 sal; Duration = 30 sec. Inflation 2: Pressure = 20 sal; Duration = 33 sec.

## 2025-05-23 NOTE — H&P
History Of Present Illness prior to [facility]   Tenzin Bates is a 74 y.o. 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     Patient reports symptoms started when his wife was undergoing medical treatment of her own last week; patient has been under a substantial amount of stress. Chest pain began last Saturday and has gradually gotten worse and has been constant. Pain is pressure like and worse with deep breathing and coughing. Patient reports these symptoms are the exact same as when he was diagnosed with pulmonary emboli previously; of note he has not been on AC for ~8 weeks. He has associated SOB when chest pressure occurs but exertion does not worsen the pain. Reports chest pain has improved since coming to the hospital but cannot pinpoint anything specifically that has improved the pain.     TTE done today shows drop in EF to 30-35% from 45-50%. Troponin peak to 8378 with subsequent downtrend.      Past Medical History  Medical History[1]    Surgical History  Surgical History[2]     Social History  He reports that he has never smoked. He has never used smokeless tobacco. He reports that he does not drink alcohol and does not use drugs.    Family History  Family History[3]     Allergies  Patient has no known allergies.    Review of Systems   Constitutional:  Positive for fatigue. Negative for unexpected weight change.   HENT:  Negative for congestion, rhinorrhea and sinus pain.    Eyes:  Negative for visual disturbance.   Respiratory:  Positive for cough and shortness of breath.    Cardiovascular:  Positive for chest pain and leg swelling.   Gastrointestinal:  Negative for abdominal pain, constipation and diarrhea.   Genitourinary:  Negative for dysuria and hematuria.   Skin:  Negative for rash and wound.   Neurological:  Negative for dizziness, light-headedness and headaches.        +unsteadiness         Physical  "Exam  Constitutional:       Appearance: Normal appearance.   Cardiovascular:      Rate and Rhythm: Normal rate and regular rhythm.   Pulmonary:      Effort: Pulmonary effort is normal.   Abdominal:      Palpations: Abdomen is soft.      Tenderness: There is no abdominal tenderness.   Musculoskeletal:      Right lower leg: Edema present.      Left lower leg: Edema present.      Comments: 1-2+ BLE edema    Skin:     General: Skin is warm and dry.   Neurological:      General: No focal deficit present.      Mental Status: He is alert and oriented to person, place, and time.          Last Recorded Vitals  Blood pressure 112/78, pulse 88, temperature 36.4 °C (97.5 °F), temperature source Temporal, resp. rate 18, height 1.803 m (5' 11\"), weight 117 kg (258 lb), SpO2 96%.    Relevant Results          Results for orders placed or performed during the hospital encounter of 05/23/25 (from the past 96 hours)   CBC and Auto Differential   Result Value Ref Range    WBC 8.4 4.4 - 11.3 x10*3/uL    nRBC 0.0 0.0 - 0.0 /100 WBCs    RBC 5.00 4.50 - 5.90 x10*6/uL    Hemoglobin 16.1 13.5 - 17.5 g/dL    Hematocrit 45.1 41.0 - 52.0 %    MCV 90 80 - 100 fL    MCH 32.2 26.0 - 34.0 pg    MCHC 35.7 32.0 - 36.0 g/dL    RDW 11.7 11.5 - 14.5 %    Platelets 170 150 - 450 x10*3/uL    Neutrophils % 66.3 40.0 - 80.0 %    Immature Granulocytes %, Automated 0.5 0.0 - 0.9 %    Lymphocytes % 22.5 13.0 - 44.0 %    Monocytes % 9.5 2.0 - 10.0 %    Eosinophils % 0.5 0.0 - 6.0 %    Basophils % 0.7 0.0 - 2.0 %    Neutrophils Absolute 5.58 (H) 1.60 - 5.50 x10*3/uL    Immature Granulocytes Absolute, Automated 0.04 0.00 - 0.50 x10*3/uL    Lymphocytes Absolute 1.89 0.80 - 3.00 x10*3/uL    Monocytes Absolute 0.80 0.05 - 0.80 x10*3/uL    Eosinophils Absolute 0.04 0.00 - 0.40 x10*3/uL    Basophils Absolute 0.06 0.00 - 0.10 x10*3/uL   Comprehensive metabolic panel   Result Value Ref Range    Glucose 90 74 - 99 mg/dL    Sodium 136 136 - 145 mmol/L    Potassium 4.3 3.5 " - 5.3 mmol/L    Chloride 102 98 - 107 mmol/L    Bicarbonate 24 21 - 32 mmol/L    Anion Gap 14 10 - 20 mmol/L    Urea Nitrogen 17 6 - 23 mg/dL    Creatinine 1.13 0.50 - 1.30 mg/dL    eGFR 68 >60 mL/min/1.73m*2    Calcium 9.2 8.6 - 10.6 mg/dL    Albumin 4.2 3.4 - 5.0 g/dL    Alkaline Phosphatase 58 33 - 136 U/L    Total Protein 7.2 6.4 - 8.2 g/dL    AST 52 (H) 9 - 39 U/L    Bilirubin, Total 1.2 0.0 - 1.2 mg/dL    ALT 32 10 - 52 U/L   Magnesium   Result Value Ref Range    Magnesium 2.19 1.60 - 2.40 mg/dL   B-Type Natriuretic Peptide   Result Value Ref Range     (H) 0 - 99 pg/mL   Troponin I, High Sensitivity, Initial   Result Value Ref Range    Troponin I, High Sensitivity (CMC) 8,378 (HH) 0 - 53 ng/L   Troponin, High Sensitivity, 1 Hour   Result Value Ref Range    Troponin I, High Sensitivity (CMC) 7,904 (HH) 0 - 53 ng/L   D-dimer, quantitative   Result Value Ref Range    D-Dimer Non VTE, Quant (ng/mL FEU) 897 (H) <=500 ng/mL FEU   aPTT - baseline   Result Value Ref Range    aPTT 33 26 - 36 seconds   Heparin Assay, UFH   Result Value Ref Range    Heparin Unfractionated 0.7 See Comment Below for Therapeutic Ranges IU/mL   Transthoracic Echo Complete   Result Value Ref Range    AV pk harrison 1.72 m/s    AV mn grad 7 mmHg    LVOT diam 2.24 cm    MV E/A ratio 0.67     LA vol index A/L 16.0 ml/m2    Tricuspid annular plane systolic excursion 1.8 cm    LV EF 33 %    RV free wall pk S' 7.00 cm/s    LVIDd 5.27 cm    RVSP 23.9 mmHg    Aortic Valve Area by Continuity of VTI 1.49 cm2    Aortic Valve Area by Continuity of Peak Velocity 2.16 cm2    AV pk grad 12 mmHg    LV A4C EF 31.4      Scheduled medications  Scheduled Medications[4]  Continuous medications  Continuous Medications[5]  PRN medications  PRN Medications[6]    Scheduled Medications[7]       Assessment & Plan  NSTEMI (non-ST elevated myocardial infarction) (Multi)      Tenzin Bates is a 74 y.o. 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     Plan  C for NSTEMI with Dr Hurst   - loaded with ASA  - cont heparin gtt   - no need to trend troponin     I spent 60 minutes in the professional and overall care of this patient.      Veronika Bauer, APRN-CNP         [1] History reviewed. No pertinent past medical history.  [2] History reviewed. No pertinent surgical history.  [3] No family history on file.  [4] aspirin, 324 mg, oral, Daily  perflutren protein A microsphere, 0.5 mL, intravenous, Once in imaging  [5] heparin, 0-4,000 Units/hr, Last Rate: 1,000 Units/hr (05/23/25 1224)  [6] PRN medications: heparin  [7] aspirin, 324 mg, oral, Daily  perflutren protein A microsphere, 0.5 mL, intravenous, Once in imaging

## 2025-05-23 NOTE — ED PROVIDER NOTES
History of Present Illness     History provided by: Patient  Limitations to History: None    HPI:  Tnezin Bates is a 74 y.o. male presenting with shortness of breath, chest pain and leg swelling for the past week.  Patient has a past medical history of a cardiomyopathy, sees cardiology at [facility].  He takes Lasix as needed for leg swelling, however patient states he has not taken Lasix in the past month.  He has a prior history of DVT and PE in  he has been off Eliquis for 6 months per hematology.  Patient states that his shortness of breath has been getting increasingly worse for the past week, he is now unable to ambulate without feeling short of breath, he is not able to go up 1 flight of stairs.  He noticed leg swelling bilaterally that started at the same time.  Additionally patient endorses left-sided arm pain that started this morning, he describes this as tingling.  He has not had this in the past.  He denies any abdominal pain, changes with bowel or bladder movements.  No dizziness or lightheadedness.      Physical Exam   Triage vitals:  T 36.4 °C (97.5 °F)  HR 98  /74  RR 16  O2 96 % None (Room air)  General: Awake, alert, in no acute distress  Eyes: Gaze conjugate.  No scleral icterus or injection  HENT: Normo-cephalic, atraumatic. No stridor  CV: Regular rate, regular rhythm. Radial pulses 2+ bilaterally.  Chest pain not reproducible  Resp: Breathing non-labored, speaking in full sentences.  Clear to auscultation bilaterally  GI: Soft, non-distended, non-tender. No rebound or guarding.  MSK/Extremities: 2+ pitting edema bilaterally on shins.  No gross bony deformities. Moving all extremities  Skin: Warm. Appropriate color  Neuro: Alert. Oriented. Face symmetric. Speech is fluent.  Gross strength and sensation intact in b/l UE and LEs  Psych: Appropriate mood and affect    Medical Decision Making & ED Course   Medical Decision Makin y.o. male presenting with shortness of  death and bilateral lower extremity swelling. Triage vital signs reviewed and patient is hemodynamically stable at this time.  Patient has prior history of cardiomyopathy per chart review, he follows with a cardiologist at [facility], he has taken Lasix in the past as needed for lower extremity swelling, reports he has not taken this for the past month. I am concerned for acute heart failure versus ACS in setting of bilateral lower extremity swelling and increasing shortness of breath. Patient does have history of prior PE, he is no longer on Eliquis per his hematologist recommendation. I have lower concern for PE at this time, Wells score low risk will obtain D-dimer.  D-dimer mildly elevated, Years criteria applied, there is no indication for CT scan at this time.  Bedside POCUS showed no B-lines, EF mildly reduced, prior echo 1 year ago with EF 50%.  Initial EKG sinus rate of 91  QTc 455 no ST elevation or depression.  Initial troponin significantly elevated at 8378, patient given aspirin load and heparin, cardiology consulted who recommended ordering complete TTE.  Repeat EKG with sinus ventricular rate 90  QTc 440 no ST elevation or depression.  Cardiology recommended admission to cardiology service.  Patient updated and admitted for NSTEMI.      ED Course:  ED Course as of 05/23/25 1215   Fri May 23, 2025   1136 Troponin I, High Sensitivity (CMC)(!!): 8,378  ASA load [JH]      ED Course User Index  [JH] Sidra Pierre DO         Diagnoses as of 05/23/25 1215   NSTEMI (non-ST elevated myocardial infarction) (Multi)     Independent Result Review and Interpretation: Relevant laboratory and radiographic results were reviewed and independently interpreted by myself.  As necessary, they are commented on in the ED Course.    Care Considerations: As documented above in MDM      Disposition   As a result of their workup, the patient will require admission to the hospital.  The patient was informed of his  diagnosis.  The patient was given the opportunity to ask questions and I answered them. The patient agreed to be admitted to the hospital.    Procedures   Procedures    Patient seen and discussed with ED attending physician.    Sidra Pierre DO  Emergency Medicine          Sidra Pierre DO  Resident  05/23/25 1913

## 2025-05-23 NOTE — Clinical Note
Angioplasty of the middle right coronary artery lesion. Inflation 1: Pressure = 12 sal; Duration = 10 sec. Inflation 2: Pressure = 14 sal; Duration = 15 sec. Inflation 3: Pressure = 16 sal; Duration = 15 sec.

## 2025-05-23 NOTE — DISCHARGE INSTRUCTIONS
Dear Mr. Bates,    You were admitted for chest pain and later taken to the cath lab to check your heart vessels. It was found that you had blockages in your heart vessels and eventually had stents placed to open up on of your vessels. Please take all your medications as ordered and follow up with all your future appointments.    Your  team.              CARDIAC CATHETERIZATION DISCHARGE INSTRUCTIONS (procedure done on 5/23/25)     FOR SUDDEN AND SEVERE CHEST PAIN, SHORTNESS OF BREATH, EXCESSIVE BLEEDING, SIGNS OF STROKE, OR CHANGES IN MENTAL STATUS YOU SHOULD CALL 911 IMMEDIATELY.     If your provider has prescribed aspirin and/or clopidogrel (Plavix), or prasugrel (Effient), or ticagrelor (Brilinta), DO NOT STOP THESE MEDICATIONS for any reason without talking to your cardiologist first. If any of these were prescribed, you must take them every day without missing a single dose. If you are getting low on these medications, contact your provider immediately for a refill.     FOR NEXT 24 HOURS  - Upon discharge, you should return home and rest for the remainder of the day and evening. You do not have to stay on bed rest but should not be very active.  It is recommended a responsible adult be with you for the first 24 hours after the procedure.    - No driving for 24 hours after procedure. Please arrange for someone to drive you home from the hospital today.     - Do not drive, operate machinery, or use power tools for 24 hours after your procedure.     - Do not make any legal decisions for 24 hours after your procedure.     - Do not drink alcoholic beverages for 24 hours after your procedure.    WOUND CARE   *FOR FEMORAL (LEG) ACCESS*  ·      Avoid heavy lifting (over 10 pounds) for 7 days, squatting or excessive bending for 2 days, and strenuous exercise for 7 days.  ·      No submerged bathing, swimming, or hot tubs for the next 7 days, or until fully healed.  ·      Avoid sexual activity for 3-4 days  until any groin discomfort has ceased.     *FOR RADIAL (WRIST) ACCESS*  ·      No lifting more than 5 pounds or excessive use of the wrist for 24 hours - for example, treat your wrist as if it is sprained.  ·      Do not engage in vigorous activities (tennis, golf, bowling, weights) for at least 48 hours after the procedure.  ·      Do not submerge the wrist for 7 days after the procedure.  ·      You should expect mild tingling in your hand and tenderness at the puncture site for up to 3 days.    - The transparent dressing should be removed from the site 24 hours after the procedure.  Wash the site gently with soap and water. Rinse well and pat dry. Keep the area clean and dry. You may apply a Band-Aid to the site. Avoid lotions, ointments, or powders until fully healed.     - You may shower the day after your procedure.      - It is normal to notice a small bruise around the puncture site and/or a small grape sized or smaller lump. Any large bruising or large lump warrants a call to the office.     - If bleeding should occur, lay down and apply pressure to the affected area for 10 minutes.  If the bleeding stops notify your physician.  If there is a large amount of bleeding or spurting of blood CALL 911 immediately.  DO NOT drive yourself to the hospital.    - You may experience some tenderness, bruising or minimal inflammation.  If you have any concerns, you may contact the Cath Lab or if any of these symptoms become excessive, contact your cardiologist or go to the emergency room.     - You can resume your regular diet right after your procedure. We always recommend a heart healthy diet with lots of fruits, vegetables and lean meats; low fat.     OTHER INSTRUCTIONS  - You may take acetaminophen (Tylenol) as directed for discomfort.  If pain is not relieved with acetaminophen (Tylenol), contact your doctor.    - If you notice or experience any of the following, you should notify your doctor or seek medical  attention  Chest pain or discomfort  Change in mental status or weakness in extremities.  Dizziness, light headedness, or feeling faint.  Change in the site where the procedure was performed, such as bleeding or an increased area of bruising or swelling.  Tingling, numbness, pain, or coolness in the leg/arm beyond the site where the procedure was performed.  Signs of infection (i.e. shaking chills, temperature > 100 degrees Fahrenheit, warmth, redness) in the leg/arm area where the procedure was performed.  Changes in urination   Bloody or black stools  Vomiting blood  Severe nose bleeds  Any excessive bleeding    - If you DO NOT have an appointment with your cardiologist within 2-4 weeks following your procedure, please contact their office.

## 2025-05-23 NOTE — Clinical Note
Closure device placed in the right radial artery. Site closed by radial compression system. 13cc on radial band

## 2025-05-23 NOTE — Clinical Note
Angioplasty of the middle right coronary artery lesion. Inflation 1: Pressure = 14 sal; Duration = 30 sec. Inflation 2: Pressure = 16 sal; Duration = 20 sec. Inflation 3: Pressure = 18 sal; Duration = 25 sec.

## 2025-05-23 NOTE — H&P
CARDIAC ICU ADMISSION NOTE    History of Present Illness  Crys Ireland is a 74 y.o. male with PMHx of of R. DVT/PE 5/2023 s/p thrombectomy, HFrEF, Obesity, HLD who presents to the ED on 5/23 with SOB, CP and bilateral leg swelling of 1 week duration. Transferred to CICU after PCI to RCA.     On presentation, patient reported 1 week of progressively worsening central chest pain worse with exertion and coughing. Also endorses b/l feet swelling worse on the left over the past week as well. He was on apixaban for a previous DVT/PE but stopped about 8 weeks ago on the advise on his doctor. EKG NSR with q waves in inferior leads. TTE done showed newly reduced EF to 30-35% from 45-50%. Troponin peaked at 8378. He was taken for LHC which showed significant clot burden and RCA occlusion which was stented.     Patient was seen in the CICU post PCI. He reports that his chest pain is resolved. He currently denies any SOB. He endorses L > R leg swelling.     CARDIAC DATA:  EKG:No results found for this or any previous visit (from the past 4464 hours).  Echocardiogram: ALICE(SAJY845:1:1825)   [place] [address]  Phone [phone] Fax [phone]    TRANSTHORACIC ECHOCARDIOGRAM REPORT      Patient Name:     CRYS LEIGH       Abbie Physician:  [id number] Fidelia IRELAND MD  Study Date:       8/10/2023       Referring           HARVINDER GAONA  Physician:  MRN/PID:          [id number]        PCP:  Accession/Order#: [id number]    [facility] Echo Lab  Location:  YOB: 1950      Fellow:  Gender:           M               Nurse:  Admit Date:                       Sonographer:        Italia Carrillo RDCS  Admission Status: Outpatient      Additional Staff:  Height:           177.00 cm       CC Report to:  Weight:           109.00 kg       Study Type:         Echocardiogram  BSA:               2.25 m2  Blood Pressure: 102 /77 mmHg    Diagnosis/ICD: I26.99-Other pulmonary embolism without acute cor pulmonale  Indication:    Bilateral Pulmonary embolism  Procedure/CPT: Echo Complete w Full Doppler-62764; Myocardial Strain  Imaging-27665    Patient History:  Pertinent History: PE.    Study Detail: The following Echo studies were performed: 2D, M-Mode, Doppler,  color flow and Strain. Technically challenging study due to  prominent lung artifact.      PHYSICIAN INTERPRETATION:  Left Ventricle: Left ventricular systolic function is mildly decreased, with an estimated ejection fraction of 45-50%. There are no regional wall motion abnormalities. The left ventricular cavity size is normal. Spectral Doppler shows an impaired relaxation pattern of left ventricular diastolic filling.  Left Atrium: The left atrium is upper limits of normal in size.  Right Ventricle: The right ventricle is upper limits of normal in size. There is normal right ventricular global systolic function. RV free wall strain -20.4% (low normal).  Right Atrium: The right atrium is mildly dilated.  Aortic Valve: The aortic valve was not well visualized. There is mild aortic valve cusp calcification. There is no evidence of aortic valve regurgitation. The peak instantaneous gradient of the aortic valve is 10.3 mmHg.  Mitral Valve: The mitral valve is normal in structure. There is trace mitral valve regurgitation.  Tricuspid Valve: The tricuspid valve is structurally normal. There is trace tricuspid regurgitation.  Pulmonic Valve: The pulmonic valve is not well visualized. There is physiologic pulmonic valve regurgitation.  Pericardium: There is no pericardial effusion noted.  Aorta: The aortic root is abnormal. There is mild dilatation of the ascending aorta.      CONCLUSIONS:  1. Left ventricular systolic function is mildly decreased with a 45-50% estimated ejection fraction.  2. Spectral Doppler shows an impaired relaxation pattern  of left ventricular diastolic filling.  3. RV free wall strain -20.4% (low normal).    QUANTITATIVE DATA SUMMARY:  2D MEASUREMENTS:  Normal Ranges:  LAs:           4.14 cm   (2.7-4.0cm)  IVSd:          1.06 cm   (0.6-1.1cm)  LVPWd:         1.14 cm   (0.6-1.1cm)  LVIDd:         4.98 cm   (3.9-5.9cm)  LVIDs:         3.81 cm  LV Mass Index: 91.3 g/m2  LV % FS        23.5 %    LA VOLUME:  Normal Ranges:  LA Vol A4C:        54.4 ml    (22+/-6mL/m2)  LA Vol A2C:        59.3 ml  LA Vol BP:         59.2 ml  LA Vol Index A4C:  24.2 ml/m2  LA Vol Index A2C:  26.4 ml/m2  LA Vol Index BP:   26.3 ml/m2  LA Area A4C:       19.1 cm2  LA Area A2C:       20.8 cm2  LA Major Axis A4C: 5.7 cm  LA Major Axis A2C: 6.2 cm  LA Vol A4C:        47.0 ml  LA Vol A2C:        57.6 ml    RA VOLUME BY A/L METHOD:  Normal Ranges:  RA Area A4C: 21.1 cm2    AORTA MEASUREMENTS:  Normal Ranges:  Ao STJ, d: 3.10 cm (1.7-3.4cm)    LV SYSTOLIC FUNCTION BY 2D PLANIMETRY (MOD):  Normal Ranges:  EF-A4C View: 56.0 % (>=55%)  EF-A2C View: 46.7 %  EF-Biplane:  52.2 %    LV DIASTOLIC FUNCTION:  Normal Ranges:  MV Peak E:    0.68 m/s    (0.7-1.2 m/s)  MV Peak A:    0.95 m/s    (0.42-0.7 m/s)  E/A Ratio:    0.72        (1.0-2.2)  MV e'         0.09 m/s    (>8.0)  MV lateral e' 0.11 m/s  MV medial e'  0.07 m/s  MV A Dur:     131.30 msec  E/e' Ratio:   7.60        (<8.0)    MITRAL VALVE:  Normal Ranges:  MV DT: 204 msec (150-240msec)    AORTIC VALVE:  Normal Ranges:  AoV Vmax:      1.61 m/s  (<=1.7m/s)  AoV Peak PG:   10.3 mmHg (<20mmHg)  LVOT Max Lino:  0.94 m/s  (<=1.1m/s)  LVOT VTI:      20.35 cm  LVOT Diameter: 2.44 cm   (1.8-2.4cm)  AoV Area,Vmax: 2.73 cm2  (2.5-4.5cm2)      RIGHT VENTRICLE:  RV Basal 4.03 cm  RV Mid   3.10 cm  RV Major 8.4 cm  TAPSE:   19.1 mm  RV s'    0.12 m/s    TRICUSPID VALVE/RVSP:  Normal Ranges:  Peak TR Velocity: 2.51 m/s  RV Syst Pressure: 28.2 mmHg (< 30mmHg)  TV E Vmax:        0.58 m/s  (0.3-0.7m/s)  TV A Vmax:        0.33 m/s  IVC  Diam:         1.73 cm  TV e'             0.1 m/s    AORTA:  Asc Ao Diam 3.91 cm      [id number] Fidelia Patrick MD  Electronically signed on 8/10/2023 at 10:03:49 AM    Stress Testing ALICE(KYX2119:1:1825): No results found for this or any previous visit from the past 1825 days.    Cardiac Catheterization:   Adult Cath     [facility], Cath Lab  [address] [place] [address]  Phone [phone] Fax [phone]    Cardiovascular Catheterization Report    Patient Name:     CRYS IRELAND Performing Physician: [id number] Ramos MD  Study Date:       5/4/2023         Verifying Physician:  Kathleen Ramos MD  MRN/PID:          [id number]         Cardiologist:  Accession/Order#: [id number]        Referring Physician:  HARVINDER RAMOS  YOB: 1950       Referring Physician:  Gender:           M                Referring Physician:      Study: Right Heart Catheterization      Indications:  CRYS IRELAND is a 72 year old male who presents with Acute hemodynamically significant PE. PE.    Procedure Description:  After infiltration of local anesthetic, the right femoral vein was identified with two-dimensional ultrasound. Under direct ultrasound visualization, the right femoral vein was cannulated with a micropuncture technique. A 7 Danish sheath was placed in the vein. A balloon tipped catheter was advanced through the right heart to record pressures and balloon tipped catheter was positioned in the right heart system to record pressures and remained in the patient when transferred from the lab. Cardiac output was calculated via the Westley method. Pulmonary angiography demonstrated successful thrombectomy with establishment of flow in the pulmonary arteries.    Procedure Description Comments:  A Pulmonary Wedge catheter was used for obtainng pressures in right Pulmonary artery. PCW was also obtained. The catheter was exchanged over a Wholey wire for JR4. Am amplatz wire was  placed through the JR4 and then the sheath was upsized to a 24F sheath. A 24F INARI catheter was used for thrombectomy from right lower lobe and right upperlobe pulmonary artery as well as left lower lobe pulmonary artery. Post procedure selective angiograms were obtained in right and left pulmonary arteries and subsegmental branches. A Figure of 8 stitch was used for hemostasis.    Right Heart Catheterization:  A balloon tipped catheter was advanced through the right heart to record pressures and balloon tipped catheter was positioned in the right heart system to record pressures and remained in the patient when transferred from the lab. Cardiac output was calculated via the Westley method. Normal filling pressures. Cardiac output is severely decreased. Reduced cardiac output at rest. Severely elevated pulmonary vascular resistance. Pulmonary arterial hypertension. Post thrombectomy. PA pressure decreased to 29/11 (17) and PA sat improved from 54 to 71.      Vascular Resistance Calculated Values (Wood Units):  +-----+---+----+---+----+  PhasePVRPVRITPRTPRI  +-----+---+----+---+----+  0    [id number]  +-----+---+----+---+----+      Complications:  No in-lab complications observed.    Cardiac Cath Transition of Care Summary:  Post Procedure Diagnosis: PE.  Blood Loss:               Estimated blood loss during the procedure was 0 mls.  Specimens Removed:        Number of specimen(s) removed: none.      Recommendations:  Maximize medical therapy.  Anticoagulation.    ____________________________________________________________________________________  CONCLUSIONS:  1. Successful Bilateral pulmonary thrombectomy.    ____________________________________________________________________________________  CPT Codes:  Right Heart Cath O2/Cardiac output without biopsy (RHC)-74507; Injection Pulmonary angiography-57201; Injection selective pulmonary art angiography, incl S&I, bilateral addon-05607; Selective  catheter placement, left or right pulmonary artery bilat-74808.50; Selective catheter placement, segmental or subsegmental pulmonary artery bilat-22363.50; Thrombectomy, perc mechanical, noncoronary, arterial or bypass graft including pharm inj, initial vessel-30553; Thrombectomy, perc mechanical, noncoronary, arterial or bypass graft including pharm inj, initial vessel-16830.50    ICD 10 Codes:  I26.09-Other pulmonary embolism with acute cor pulmonale    [id number] Geronimo Ramos MD  Performing Physician  Electronically signed by [id number] Geronimo Ramos MD on 5/5/2023 at 10:36:39 AM      cc Report to: GERONIMO RAMOS          ASCVD RISK: The ASCVD Risk score (Kusum DAS, et al., 2019) failed to calculate for the following reasons:    Risk score cannot be calculated because patient has a medical history suggesting prior/existing ASCVD     Past Medical History  Medical History[1]    Surgical History  Surgical History[2]    Social History  He reports that he has never smoked. He has never used smokeless tobacco. He reports that he does not drink alcohol and does not use drugs.    Family History  Family History[3]     Allergies  Patient has no known allergies.    Home Medications  Prior to Admission medications    Medication Sig Start Date End Date Taking? Authorizing Provider   apixaban (Eliquis) 5 mg tablet Take 1 tablet (5 mg) by mouth 2 times a day. 9/19/24 10/19/24  Sameera Hermosillo APRN-CNP   atorvastatin (Lipitor) 20 mg tablet Take 1 tablet (20 mg) by mouth once daily. 1/7/22   Historical Provider, MD   allopurinol (Zyloprim) 100 mg tablet Take 2 tablets (200 mg) by mouth once daily. 3/22/24 5/23/25  Historical Provider, MD   carvedilol (Coreg) 3.125 mg tablet Take 1 tablet (3.125 mg) by mouth. 5/7/23 5/23/25  Historical Provider, MD   losartan-hydrochlorothiazide (Hyzaar) 50-12.5 mg tablet Take 1 tablet by mouth once daily. 10/12/18 5/23/25  Historical Provider, MD   meloxicam (Mobic) 15 mg tablet Take 1 tablet (15 mg) by mouth.  "3/26/24 5/23/25  Historical Provider, MD   tamsulosin (Flomax) 0.4 mg 24 hr capsule Take 1 capsule (0.4 mg) by mouth once daily. 8/29/22 5/23/25  Historical Provider, MD         Vitals:   /77   Pulse 88   Temp 36.4 °C (97.5 °F) (Temporal)   Resp 14   Ht 1.803 m (5' 11\")   Wt 117 kg (258 lb)   SpO2 95%   BMI 35.98 kg/m²          5/23/2025     9:32 AM 5/23/2025    11:50 AM 5/23/2025    12:45 PM 5/23/2025     2:13 PM 5/23/2025     2:30 PM 5/23/2025     3:24 PM 5/23/2025     5:28 PM   Vitals   Systolic 140 124 120 123 112 129 126   Diastolic 74 86 90 89 78 86 77   BP Location  Left arm        Heart Rate 98 87 86 87 88 91 88   Temp 36.4 °C (97.5 °F)         Resp 16 18 20 16 18 16 14   Height 1.803 m (5' 11\")         Weight (lb) 258         BMI 35.98 kg/m2         BSA (m2) 2.42 m2           Labs:  Results from last 7 days   Lab Units 05/23/25  1010   WBC AUTO x10*3/uL 8.4   HEMOGLOBIN g/dL 16.1   HEMATOCRIT % 45.1   PLATELETS AUTO x10*3/uL 170     Results from last 7 days   Lab Units 05/23/25  1010   SODIUM mmol/L 136   POTASSIUM mmol/L 4.3   CO2 mmol/L 24   ANION GAP mmol/L 14   BUN mg/dL 17   CREATININE mg/dL 1.13   GLUCOSE mg/dL 90   EGFR mL/min/1.73m*2 68   MAGNESIUM mg/dL 2.19      Results from last 7 days   Lab Units 05/23/25  1010   ALT U/L 32   AST U/L 52*   ALK PHOS U/L 58            No lab exists for component: \"PT\"                No lab exists for component: \"BNPRESU\", \"CPKT\"        Results from last 7 days   Lab Units 05/23/25  1104 05/23/25  1010   TROPHSCMC ng/L 7,904* 8,378*       No results found for: \"CKTOTAL\", \"CKMB\", \"CKMBINDEX\", \"TROPONINI\"   No results found for: \"HGBA1C\"   No results found for: \"CHOL\"  No results found for: \"HDL\"  No results found for: \"LDLCALC\"  No results found for: \"TRIG\"  No components found for: \"CHOLHDL\"     Imaging:  Imaging  XR chest 2 views  Result Date: 5/23/2025  No evidence of acute intrathoracic abnormality.   Signed by: Braxton Aguirre 5/23/2025 10:20 AM " Dictation workstation:   [id number]      Cardiology, Vascular, and Other Imaging  Transthoracic Echo Complete  Result Date: 5/23/2025   [facility], [address] [place] [address]                Tel [phone] and Fax [phone] TRANSTHORACIC ECHOCARDIOGRAM REPORT  Patient Name:       CRYS IRELAND    Reading Physician:    [id number] Zana Davenport MD Study Date:         5/23/2025           Ordering Provider:    [id number] JOVON RODRIGUEZ MRN/PID:            [id number]            Fellow:               [id number] Elizabeth Estrada MD Accession#:         [id number]        Nurse: Date of Birth/Age:  1950 / 74     Sonographer:          Jose L jurado                                     RDCS, RVT Gender assigned at                     Additional Staff: Birth: Height:             180.34 cm           Admit Date:           5/23/2025 Weight:             117.03 kg           Admission Status:     Inpatient - STAT BSA / BMI:          2.35 m2 / 35.98     Encounter#:           [id number]                     kg/m2 Blood Pressure:     120/90 mmHg         Department Location:  [facility]                                                               Non Invasive Study Type:    TRANSTHORACIC ECHO (TTE) COMPLETE Diagnosis/ICD: Chest pain, unspecified-R07.9; Shortness of breath-R06.02;                Elevated Troponin-R79.89 Indication:    elevated troponin, chest pain, shortness of breath CPT Code:      Echo Complete w Full Doppler-15534 Patient History: Pertinent History: MI, DVT, PE, obesity. Study Detail: The following Echo studies were performed: 2D, M-Mode, Doppler and               color flow. Optison used as a contrast agent for endocardial               border definition and agitated saline used  as a contrast agent for               intraseptal flow evaluation. Total contrast used for this               procedure was 1.5 mL via IV push.  PHYSICIAN INTERPRETATION: Left Ventricle: Left ventricular ejection fraction is moderately decreased, by visual estimate at 30-35%. There are no regional left ventricular wall motion abnormalities. The left ventricular cavity size is normal. There is normal septal and normal posterior left ventricular wall thickness. Spectral Doppler shows a Grade I (impaired relaxation pattern) of left ventricular diastolic filling with normal left atrial filling pressure. There is septal-apical and inferolateral hypokinesia. Left Atrium: The left atrial size is normal. A bubble study using agitated saline was performed. Bubble study is positive. A moderate PFO (11-20 bubbles) was demonstrated. Right Ventricle: The right ventricle is normal in size. There is normal right ventricular global systolic function. Right Atrium: The right atrium is normal in size. Aortic Valve: The aortic valve is trileaflet. The aortic valve dimensionless index is 0.38. There is no evidence of aortic valve regurgitation. The peak instantaneous gradient of the aortic valve is 12 mmHg. The mean gradient of the aortic valve is 7 mmHg. Mitral Valve: The mitral valve is normal in structure. There is trace mitral valve regurgitation. Tricuspid Valve: The tricuspid valve is structurally normal. There is trace to mild tricuspid regurgitation. The Doppler estimated RVSP is within normal limits at 23.9 mmHg. Pulmonic Valve: The pulmonic valve is structurally normal. There is physiologic pulmonic valve regurgitation. Pericardium: Trivial pericardial effusion. There is a pericardial fat pad present. Aorta: The aortic root is normal. There is mild dilatation of the aortic root. Systemic Veins: The inferior vena cava appears normal in size, with IVC inspiratory collapse greater than 50%. In comparison to the previous  echocardiogram(s): Compared with study dated 8/10/2023, the EF now appears to be 30-35% compared to 45-50% on prior study.  CONCLUSIONS:  1. Left ventricular ejection fraction is moderately decreased, by visual estimate at 30-35%.  2. Spectral Doppler shows a Grade I (impaired relaxation pattern) of left ventricular diastolic filling with normal left atrial filling pressure.  3. There is normal right ventricular global systolic function.  4. Right ventricular systolic pressure is within normal limits.  5. A bubble study using agitated saline was performed. Bubble study is positive. A moderate PFO (11-20 bubbles) was demonstrated. QUANTITATIVE DATA SUMMARY:  2D MEASUREMENTS:          Normal Ranges: IVSd:            0.78 cm  (0.6-1.1cm) LVPWd:           0.88 cm  (0.6-1.1cm) LVIDd:           5.27 cm  (3.9-5.9cm) LVIDs:           4.37 cm LV Mass Index:   67 g/m2 LVEDV Index:     46 ml/m2 LV % FS          17.1 %  LEFT ATRIUM:                  Normal Ranges: LA Vol A4C:        53.5 ml    (22+/-6mL/m2) LA Vol A2C:        25.6 ml LA Vol BP:         37.7 ml LA Vol Index A4C:  22.8ml/m2 LA Vol Index A2C:  10.9 ml/m2 LA Vol Index BP:   16.0 ml/m2 LA Area A4C:       18.6 cm2 LA Area A2C:       13.1 cm2 LA Major Axis A4C: 5.5 cm LA Major Axis A2C: 5.7 cm  RIGHT ATRIUM:          Normal Ranges: RA Area A4C:  17.9 cm2  LV SYSTOLIC FUNCTION:                      Normal Ranges: EF-A4C View:    31 % (>=55%) EF-A2C View:    39 % EF-Biplane:     34 % EF-Visual:      33 % LV EF Reported: 33 %  LV DIASTOLIC FUNCTION:            Normal Ranges: MV Peak E:             0.59 m/s   (0.7-1.2 m/s) MV Peak A:             0.88 m/s   (0.42-0.7 m/s) E/A Ratio:             0.67       (1.0-2.2) MV e'                  0.080 m/s  (>8.0) MV lateral e'          0.08 m/s MV medial e'           0.08 m/s E/e' Ratio:            7.36       (<8.0) PulmV Sys Lino:         29.80 cm/s  MITRAL VALVE:          Normal Ranges: MV DT:        175 msec (150-240msec)   AORTIC VALVE:                      Normal Ranges: AoV Vmax:                1.72 m/s  (<=1.7m/s) AoV Peak P.8 mmHg (<20mmHg) AoV Mean P.8 mmHg  (1.7-11.5mmHg) LVOT Max Lino:            0.94 m/s  (<=1.1m/s) AoV VTI:                 32.14 cm  (18-25cm) LVOT VTI:                12.17 cm LVOT Diameter:           2.24 cm   (1.8-2.4cm) AoV Area, VTI:           1.49 cm2  (2.5-5.5cm2) AoV Area,Vmax:           2.16 cm2  (2.5-4.5cm2) AoV Dimensionless Index: 0.38  RIGHT VENTRICLE: RV Basal 3.37 cm RV Mid   2.75 cm RV Major 6.9 cm TAPSE:   18.0 mm RV s'    0.07 m/s  TRICUSPID VALVE/RVSP:          Normal Ranges: Peak TR Velocity:     2.29 m/s RV Syst Pressure:     24 mmHg  (< 30mmHg) IVC Diam:             1.23 cm  PULMONIC VALVE:          Normal Ranges: PV Accel Time:  99 msec  (>120ms) PV Max Lino:     1.1 m/s  (0.6-0.9m/s) PV Max P.0 mmHg  PULMONARY VEINS: PulmV Sys Lino: 29.80 cm/s  AORTA: Asc Ao Diam 3.72 cm  [id number] Zana Davenport MD Electronically signed on 2025 at 2:36:57 PM  ** Final **     Point of Care Ultrasound  Result Date: 2025  Yumiko Castelan MD     2025  3:07 PM Performed by: Yumiko Castelan MD Authorized by: Sidra Pierre DO  Cardiac Indications: chest pain Procedure: Cardiac Ultrasound Findings:  Views: parasternal long, parasternal short and apical four The pericardial space was visualized and was NEGATIVE for a significant pericardial effusion. Activity: Ventricular contractions were visualized. LV: LV systolic function was DECREASED. RV: RV size was NORMAL. Impression: Cardiac: The focused cardiac ultrasound exam had ABNORMAL findings as specified.          ED Interventions:  Medications   aspirin chewable tablet 324 mg (324 mg oral Given 25 7389)   perflutren protein A microsphere (Optison) injection 0.5 mL (has no administration in time range)   heparin 25,000 Units in dextrose 5% 250 mL (100 Units/mL) infusion (premix) (1,000 Units/hr  intravenous New Bag 5/23/25 1224)   heparin bolus from bag 2,000-4,000 Units (has no administration in time range)   atorvastatin (Lipitor) tablet 40 mg (has no administration in time range)   heparin bolus from bag 4,000 Units (4,000 Units intravenous Bolus from Bag 5/23/25 1226)        Physical Exam   Constitutional: No acute distress, resting comfortably in bed, cooperative  HEENT: Normocephalic, atraumatic, PERRLA, EOMI, moist mucous membranes, no pharyngeal erythema  Cardiovascular: RRR, normal S1/S2, no murmurs noted  Pulmonary: Clear to auscultation b/l, no wheezes/crackles/rhonchi, no increased work of breathing, no supplemental oxygen  GI: Soft, non-tender, non-distended, normoactive bowel sounds  : No garcia catheter  Lower extremities: Warm and well perfused, no lower extremity edema  Neuro: A&O x3, able to move all 4 extremities spontaneously, normal gait  Psych: Appropriate mood and affect     Medications  Scheduled medications  Scheduled Medications[4]    Continuous medications  Continuous Medications[5]    PRN medications  PRN Medications[6]      Assessment/Plan   75 yo Tenzin Browser with PMHx significant for R. DVT/PE 5/2023 s/p thrombectomy, HFrEF, Obesity, HLD who presents to the ED on 5/23 with SOB, CP and bilateral leg swelling of 1 week duration. Trops elevated and EKG without overt ischemic features, TTE with EF of 30-35%(vs 45% 5/2023), presentation c/f NSTEMI. Other differentials for his CP/SOB are DVT/PE(I/s/o hx of DVT/PE, elevated D-dimers, although unspecific) and heart failure exacerbation although less likely(I/s/o reduced EF, b/l feet swelling and bibasal crepitations). Patient will get urgent Cath for NSTEMI and reduced EF. Now s/p PCI to RCA. Admitted to CICU.      Neuro:   ROHINI    Cardiac:    #NSTEMI s/p PCI to RCA   #HFrEF  :: CP on exertion for 1 week  ::EKG without ST elevation  ::Trops 8378-->7904  ::TTE: EF 30-35 without RWMA  ::S/p ASA load and Heparin drip  :: LHC with 100%  stenosis, pre intervention MARISOL 0 flow s/p LM stent to RCA  PLAN:  -c/w Heparin drip 2 hours after TR band removed   -Atorvastatin 40 mg daily  - Loaded with ASA and brillinta in cath lab  - Will start ASA + brilinta BID      #HFrEF  #HLD  ::Hx of feet swelling and SOB  ::BNP:142  ::CXR w/out cardiomegaly  ::TTE with EF of 30-35% vs 45(5/2023)  PLAN  -lipid profile ordered  -Atorvastatin 40 mg daily  -diurese prn  - Will consider initiating GDMT      #Hx of PE/DVT 5/23  #C/f PE  :: R DVT right distal femoral and popliteal vein   ::Heart strain from PE, Rt heart cath with pulmonary thrombectomy  ::Echo at that time EF 45-50%   ::was on Apixaban, stopped 8 weeks ago  ::D-dime 897, TTE without RH strain  PLAN  -Consider CTPE  -DVT of b/l LE pending   -Heparin drip     Respiratory:   ROHINI    GI:   ROHINI    Renal:   ROHINI     ID:   ROHINI      F:Caution  E:K>4, Mg>2  N:Cardiac diet  DVT ppx: heparin drip  GI ppx: not indicated  Bowel care: Miralax     Code Status: Full code for now as he's getting a procedure (WILL BE REVERSED TO DNR/DNI 24 hours after procedure per patient wishes)   NOK:Asad Bates(Wife) [phone]    Code Status: No Order (confirmed on admission)   NOK: Extended Emergency Contact Information  Primary Emergency Contact: Ivory Villegas  Mobile Phone: [phone]  Relation: Daughter  Secondary Emergency Contact: BEKAARINAYO  Mobile Phone: [phone]  Relation: Spouse      Tory Olivarez MD  PGY-2 Internal Medicine              [1] History reviewed. No pertinent past medical history.  [2] History reviewed. No pertinent surgical history.  [3] No family history on file.  [4] aspirin, 324 mg, oral, Daily  atorvastatin, 40 mg, oral, Nightly  perflutren protein A microsphere, 0.5 mL, intravenous, Once in imaging  [5] heparin, 0-4,000 Units/hr, Last Rate: 1,000 Units/hr (05/23/25 1224)  [6] PRN medications: heparin

## 2025-05-23 NOTE — Clinical Note
Angioplasty of the proximal right coronary artery lesion. Inflation 1: Pressure = 12 sal; Duration = 10 sec. Inflation 2: Pressure = 14 sal; Duration = 10 sec.

## 2025-05-23 NOTE — H&P
History Of Present Illness  Tenzin Bates is a 74 y.o. 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    Patient says over the past week he's been experiencing central chest pain worse with exertion and with coughing, pain has been getting worse with time. Cough is non productive and not associated with fever, chills or hemoptysis. Patient admits to b/l feet swelling worse on the left over the past week as well. Patient says over the past week, he has been driving from [place] to [place] to see his wife who has been admitted to . He was on apixaban for a previous DVT/PE but stopped about 8 weeks ago on the advise on his doctor. A CTA done on Feb 2, 2025 showed no PEs. He admits to a positive Fhx of DVT in her mother and denies smoking, alcohol or recreational drug use. He denies dizziness, palpitations, orthopnea, or PND. Patient reported not taking any chronic medications except for some vitamins.      ED Course:  Triage vitals:  T 36.4 °C (97.5 °F)  HR 98  /74  RR 16  O2 96 % None (Room air)    ED labs:  CBC Wcc 8.4, hb 16.1, plt 170  CMP, wnl except AST 52  , D-dimer 897,  Trop 8378-->7904    EKG: without ischemic changes  CXR: No acute cardiopulmonary findings  POCUS: Decreased LV systolic function    Intervention:  ASA load, heparin drip     Past Medical History  He has no past medical history on file.    Surgical History  He has no past surgical history on file.     Social History  He reports that he has never smoked. He has never used smokeless tobacco. He reports that he does not drink alcohol and does not use drugs.    Family History  Family History[1]     Allergies  Patient has no known allergies.    Review of Systems   All other systems reviewed and are negative.         Physical Exam  Constitutional:       General: He is not in acute distress.      Appearance: He is not ill-appearing.   HENT:      Head: Normocephalic.      Nose: No congestion or rhinorrhea.   Eyes:      General: No scleral icterus.        Right eye: No discharge.      Pupils: Pupils are equal, round, and reactive to light.   Cardiovascular:      Rate and Rhythm: Normal rate and regular rhythm.      Heart sounds: No murmur heard.  Pulmonary:      Effort: No respiratory distress.      Breath sounds: Rales (few bibasal crepitations) present. No wheezing.   Abdominal:      General: There is no distension.      Palpations: There is no mass.      Tenderness: There is no abdominal tenderness. There is no guarding.   Musculoskeletal:      Right lower leg: Edema present.      Left lower leg: Edema present.   Skin:     Capillary Refill: Capillary refill takes less than 2 seconds.      Coloration: Skin is not jaundiced or pale.   Neurological:      General: No focal deficit present.      Mental Status: He is alert and oriented to person, place, and time.   Psychiatric:         Mood and Affect: Mood normal.          Last Recorded Vitals  /90   Pulse 86   Temp 36.4 °C (97.5 °F) (Temporal)   Resp 20   Wt 117 kg (258 lb)   SpO2 95%     Relevant Results        Results for orders placed or performed during the hospital encounter of 05/23/25 (from the past 24 hours)   CBC and Auto Differential   Result Value Ref Range    WBC 8.4 4.4 - 11.3 x10*3/uL    nRBC 0.0 0.0 - 0.0 /100 WBCs    RBC 5.00 4.50 - 5.90 x10*6/uL    Hemoglobin 16.1 13.5 - 17.5 g/dL    Hematocrit 45.1 41.0 - 52.0 %    MCV 90 80 - 100 fL    MCH 32.2 26.0 - 34.0 pg    MCHC 35.7 32.0 - 36.0 g/dL    RDW 11.7 11.5 - 14.5 %    Platelets 170 150 - 450 x10*3/uL    Neutrophils % 66.3 40.0 - 80.0 %    Immature Granulocytes %, Automated 0.5 0.0 - 0.9 %    Lymphocytes % 22.5 13.0 - 44.0 %    Monocytes % 9.5 2.0 - 10.0 %    Eosinophils % 0.5 0.0 - 6.0 %    Basophils % 0.7 0.0 - 2.0 %    Neutrophils Absolute 5.58 (H) 1.60 - 5.50 x10*3/uL    Immature  Granulocytes Absolute, Automated 0.04 0.00 - 0.50 x10*3/uL    Lymphocytes Absolute 1.89 0.80 - 3.00 x10*3/uL    Monocytes Absolute 0.80 0.05 - 0.80 x10*3/uL    Eosinophils Absolute 0.04 0.00 - 0.40 x10*3/uL    Basophils Absolute 0.06 0.00 - 0.10 x10*3/uL   Comprehensive metabolic panel   Result Value Ref Range    Glucose 90 74 - 99 mg/dL    Sodium 136 136 - 145 mmol/L    Potassium 4.3 3.5 - 5.3 mmol/L    Chloride 102 98 - 107 mmol/L    Bicarbonate 24 21 - 32 mmol/L    Anion Gap 14 10 - 20 mmol/L    Urea Nitrogen 17 6 - 23 mg/dL    Creatinine 1.13 0.50 - 1.30 mg/dL    eGFR 68 >60 mL/min/1.73m*2    Calcium 9.2 8.6 - 10.6 mg/dL    Albumin 4.2 3.4 - 5.0 g/dL    Alkaline Phosphatase 58 33 - 136 U/L    Total Protein 7.2 6.4 - 8.2 g/dL    AST 52 (H) 9 - 39 U/L    Bilirubin, Total 1.2 0.0 - 1.2 mg/dL    ALT 32 10 - 52 U/L   Magnesium   Result Value Ref Range    Magnesium 2.19 1.60 - 2.40 mg/dL   B-Type Natriuretic Peptide   Result Value Ref Range     (H) 0 - 99 pg/mL   Troponin I, High Sensitivity, Initial   Result Value Ref Range    Troponin I, High Sensitivity (CMC) 8,378 (HH) 0 - 53 ng/L   Troponin, High Sensitivity, 1 Hour   Result Value Ref Range    Troponin I, High Sensitivity (CMC) 7,904 (HH) 0 - 53 ng/L   D-dimer, quantitative   Result Value Ref Range    D-Dimer Non VTE, Quant (ng/mL FEU) 897 (H) <=500 ng/mL FEU   aPTT - baseline   Result Value Ref Range    aPTT 33 26 - 36 seconds   Heparin Assay, UFH   Result Value Ref Range    Heparin Unfractionated 0.7 See Comment Below for Therapeutic Ranges IU/mL   Transthoracic Echo Complete   Result Value Ref Range    AV pk harrison 1.72 m/s    AV mn grad 7 mmHg    LVOT diam 2.24 cm    MV E/A ratio 0.67     LA vol index A/L 16.0 ml/m2    Tricuspid annular plane systolic excursion 1.8 cm    LV EF 33 %    RV free wall pk S' 7.00 cm/s    LVIDd 5.27 cm    RVSP 23.9 mmHg    Aortic Valve Area by Continuity of VTI 1.49 cm2    Aortic Valve Area by Continuity of Peak Velocity 2.16  cm2    AV pk grad 12 mmHg    LV A4C EF 31.4       XR chest 2 views  Result Date: 5/23/2025  No evidence of acute intrathoracic abnormality.   Signed by: Braxton Aguirre 5/23/2025 10:20 AM Dictation workstation:   [id number]      TTE 5/23  CONCLUSIONS:   1. Left ventricular ejection fraction is moderately decreased, by visual estimate at 30-35%.   2. Spectral Doppler shows a Grade I (impaired relaxation pattern) of left ventricular diastolic filling with normal left atrial filling pressure.   3. There is normal right ventricular global systolic function.   4. Right ventricular systolic pressure is within normal limits.   5. A bubble study using agitated saline was performed. Bubble study is positive. A moderate PFO (11-20 bubbles) was demonstrated.    TTE 5/5/2023  CONCLUSIONS:  1. Left ventricular systolic function is mildly decreased with a 45% estimated ejection fraction.  2. Spectral Doppler shows an impaired relaxation pattern of left ventricular diastolic filling.  3. There is mildly reduced right ventricular systolic function.  4. Aortic valve stenosis is not present.  5. There is global hypokinesis of the left ventricle with minor regional variations.     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 Trops elevated and EKG without overt ischemic features, TTE with EF of 30-35%(vs 45% 5/2023), presentation c/f NSTEMI. Other differentials for his CP/SOB are DVT/PE(I/s/o hx of DVT/PE, elevated D-dimers, although unspecific) and heart failure exacerbation although less likely(I/s/o reduced EF, b/l feet swelling and bibasal crepitations). Patient will get urgent Cath for NSTEMI and reduced EF.       #Chest pain  #NSTEMI  #HFrEF  :: CP on exertion for 1 week  ::EKG without ST elevation  ::Trops 8378-->7904  ::TTE: EF 30-35 without  RWMA  ::S/p ASA load and Heparin drip  PLAN:  -c/w Heparin drip  -Atorvastatin 80 mg daily  -Urgent heart cath  -FU post cath recs    #HFrEF  #HLD  ::Hx of feet swelling and SOB  ::BNP:142  ::CXR w/out cardiomegaly  ::TTE with EF of 30-35% vs 45(5/2023)  PLAN  -lipid profile ordered  -Atorvastatin 80 mg daily  -diurese prn    #Hx of PE/DVT 5/23  #C/f PE  :: R DVT right distal femoral and popliteal vein   ::Heart strain from PE, Rt heart cath with pulmonary thrombectomy  ::Echo at that time EF 45-50%   ::was on Apixaban, stopped 8 weeks ago  ::D-dime 897, TTE without RH strain  PLAN  -Consider CTPE  -Consider DVT of b/l LE  -Heparin drip      F:Caution  E:K>4, Mg>2  N:Cardiac diet  DVT ppx: heparin drip  GI ppx: not indicated  Bowel care: Miralax    Code Status: Full code for now as he's getting a procedure  NOK:Asad Bates(Wife) [phone]      Sanjiv Tan MD  Internal Medicine, PGY1         [1] No family history on file.

## 2025-05-23 NOTE — ED TRIAGE NOTES
Pt presents to the ED c/o L sided chest pain that occasionally radiates to his l hand. Pt has a hx of PE in his lungs 22-23. Pt was taken off eliquis 3 months ago. Per pt this feels very similar to when he had blood clots.

## 2025-05-23 NOTE — Clinical Note
Vessel: RCA (proximal). Stent inserted. Inflation 1: Pressure = 18 sal; Duration = 20 sec. Inflation 2: Pressure = 18 sal; Duration = 10 sec. Inflation 3: Pressure = 18 sal; Duration = 6 sec.

## 2025-05-23 NOTE — ED PROCEDURE NOTE
Procedure    Performed by: Yumiko Castelan MD  Authorized by: Sidra Pierre DO    Cardiac Indications: chest pain                  Procedure: Cardiac Ultrasound    Findings:   Views: parasternal long, parasternal short and apical four  The pericardial space was visualized and was NEGATIVE for a significant pericardial effusion.  Activity: Ventricular contractions were visualized.  LV: LV systolic function was DECREASED.  RV: RV size was NORMAL.    Impression:  Cardiac: The focused cardiac ultrasound exam had ABNORMAL findings as specified.          I reviewed the images and agree with the documentation.    MD Yumiko Kaminski MD  05/23/25 1253       Yumiko Castelan MD  05/23/25 1507       Yumiko Castelan MD  05/23/25 1502

## 2025-05-23 NOTE — Clinical Note
Vessel: RCA (mid). Stent inserted. Inflation 1: Pressure = 18 sal; Duration = 40 sec. Mid to Prox RCA

## 2025-05-23 NOTE — SIGNIFICANT EVENT
74y old M with PMH of cardiomyopathy, CAD s/p remote PCI and PE (off anticoagulation 8 weeks ago) who presents with L sided chest pain with exertion for 1 week. Pain is exacerbated by exertion and gets worse going up and down stairs. L arm went numb last night which is why he came to the ED. Trops 8400. EKG NSR with  q waves in inferior leads. Differential is NSTEMI vs PE. TTE with hypokinesis of inferior wall    -TTE   -Plan for CT PE followed by C  -s/p ASA 325mg. On heparin gtt  -start atorva 40mg     Admit to cardiology

## 2025-05-24 LAB
ALBUMIN SERPL BCP-MCNC: 3.6 G/DL (ref 3.4–5)
ANION GAP SERPL CALC-SCNC: 12 MMOL/L (ref 10–20)
BASOPHILS # BLD AUTO: 0.03 X10*3/UL (ref 0–0.1)
BASOPHILS NFR BLD AUTO: 0.4 %
BUN SERPL-MCNC: 16 MG/DL (ref 6–23)
CALCIUM SERPL-MCNC: 8.9 MG/DL (ref 8.6–10.6)
CHLORIDE SERPL-SCNC: 103 MMOL/L (ref 98–107)
CO2 SERPL-SCNC: 26 MMOL/L (ref 21–32)
CREAT SERPL-MCNC: 1.01 MG/DL (ref 0.5–1.3)
EGFRCR SERPLBLD CKD-EPI 2021: 78 ML/MIN/1.73M*2
EOSINOPHIL # BLD AUTO: 0.05 X10*3/UL (ref 0–0.4)
EOSINOPHIL NFR BLD AUTO: 0.6 %
ERYTHROCYTE [DISTWIDTH] IN BLOOD BY AUTOMATED COUNT: 11.6 % (ref 11.5–14.5)
GLUCOSE SERPL-MCNC: 91 MG/DL (ref 74–99)
HCT VFR BLD AUTO: 42.2 % (ref 41–52)
HGB BLD-MCNC: 14.7 G/DL (ref 13.5–17.5)
IMM GRANULOCYTES # BLD AUTO: 0.05 X10*3/UL (ref 0–0.5)
IMM GRANULOCYTES NFR BLD AUTO: 0.6 % (ref 0–0.9)
LYMPHOCYTES # BLD AUTO: 1.48 X10*3/UL (ref 0.8–3)
LYMPHOCYTES NFR BLD AUTO: 19.1 %
MAGNESIUM SERPL-MCNC: 2.04 MG/DL (ref 1.6–2.4)
MCH RBC QN AUTO: 32.2 PG (ref 26–34)
MCHC RBC AUTO-ENTMCNC: 34.8 G/DL (ref 32–36)
MCV RBC AUTO: 93 FL (ref 80–100)
MONOCYTES # BLD AUTO: 0.81 X10*3/UL (ref 0.05–0.8)
MONOCYTES NFR BLD AUTO: 10.5 %
NEUTROPHILS # BLD AUTO: 5.32 X10*3/UL (ref 1.6–5.5)
NEUTROPHILS NFR BLD AUTO: 68.8 %
NRBC BLD-RTO: 0 /100 WBCS (ref 0–0)
PHOSPHATE SERPL-MCNC: 4.3 MG/DL (ref 2.5–4.9)
PLATELET # BLD AUTO: 162 X10*3/UL (ref 150–450)
POTASSIUM SERPL-SCNC: 4.1 MMOL/L (ref 3.5–5.3)
RBC # BLD AUTO: 4.56 X10*6/UL (ref 4.5–5.9)
SODIUM SERPL-SCNC: 137 MMOL/L (ref 136–145)
UFH PPP CHRO-ACNC: 0.3 IU/ML (ref ?–1.1)
UFH PPP CHRO-ACNC: 0.3 IU/ML (ref ?–1.1)
WBC # BLD AUTO: 7.7 X10*3/UL (ref 4.4–11.3)

## 2025-05-24 PROCEDURE — 80069 RENAL FUNCTION PANEL: CPT

## 2025-05-24 PROCEDURE — 83735 ASSAY OF MAGNESIUM: CPT

## 2025-05-24 PROCEDURE — 2500000001 HC RX 250 WO HCPCS SELF ADMINISTERED DRUGS (ALT 637 FOR MEDICARE OP)

## 2025-05-24 PROCEDURE — 2500000002 HC RX 250 W HCPCS SELF ADMINISTERED DRUGS (ALT 637 FOR MEDICARE OP, ALT 636 FOR OP/ED)

## 2025-05-24 PROCEDURE — 99223 1ST HOSP IP/OBS HIGH 75: CPT

## 2025-05-24 PROCEDURE — 85025 COMPLETE CBC W/AUTO DIFF WBC: CPT

## 2025-05-24 PROCEDURE — 1200000002 HC GENERAL ROOM WITH TELEMETRY DAILY

## 2025-05-24 PROCEDURE — 36415 COLL VENOUS BLD VENIPUNCTURE: CPT

## 2025-05-24 PROCEDURE — 99232 SBSQ HOSP IP/OBS MODERATE 35: CPT

## 2025-05-24 PROCEDURE — 85520 HEPARIN ASSAY: CPT

## 2025-05-24 RX ORDER — NAPROXEN SODIUM 220 MG/1
81 TABLET, FILM COATED ORAL DAILY
Status: DISCONTINUED | OUTPATIENT
Start: 2025-05-24 | End: 2025-05-24 | Stop reason: SDUPTHER

## 2025-05-24 RX ORDER — TAMSULOSIN HYDROCHLORIDE 0.4 MG/1
0.4 CAPSULE ORAL DAILY
Status: DISCONTINUED | OUTPATIENT
Start: 2025-05-24 | End: 2025-05-25 | Stop reason: HOSPADM

## 2025-05-24 RX ORDER — CALCIUM CARBONATE 200(500)MG
500 TABLET,CHEWABLE ORAL DAILY
Status: DISCONTINUED | OUTPATIENT
Start: 2025-05-24 | End: 2025-05-25 | Stop reason: HOSPADM

## 2025-05-24 RX ORDER — POLYETHYLENE GLYCOL 3350 17 G/17G
17 POWDER, FOR SOLUTION ORAL DAILY
Status: DISCONTINUED | OUTPATIENT
Start: 2025-05-24 | End: 2025-05-25 | Stop reason: HOSPADM

## 2025-05-24 RX ORDER — ALLOPURINOL 100 MG/1
200 TABLET ORAL DAILY
Status: DISCONTINUED | OUTPATIENT
Start: 2025-05-24 | End: 2025-05-24

## 2025-05-24 RX ORDER — ATORVASTATIN CALCIUM 80 MG/1
80 TABLET, FILM COATED ORAL NIGHTLY
Status: DISCONTINUED | OUTPATIENT
Start: 2025-05-24 | End: 2025-05-24

## 2025-05-24 RX ORDER — PANTOPRAZOLE SODIUM 40 MG/1
40 TABLET, DELAYED RELEASE ORAL
Status: DISCONTINUED | OUTPATIENT
Start: 2025-05-24 | End: 2025-05-25 | Stop reason: HOSPADM

## 2025-05-24 RX ADMIN — ATORVASTATIN CALCIUM 40 MG: 40 TABLET, FILM COATED ORAL at 20:10

## 2025-05-24 RX ADMIN — TICAGRELOR 90 MG: 90 TABLET ORAL at 09:50

## 2025-05-24 RX ADMIN — PANTOPRAZOLE SODIUM 40 MG: 40 TABLET, DELAYED RELEASE ORAL at 12:15

## 2025-05-24 RX ADMIN — TICAGRELOR 90 MG: 90 TABLET ORAL at 20:10

## 2025-05-24 RX ADMIN — TAMSULOSIN HYDROCHLORIDE 0.4 MG: 0.4 CAPSULE ORAL at 20:10

## 2025-05-24 RX ADMIN — ASPIRIN 81 MG: 81 TABLET, CHEWABLE ORAL at 09:50

## 2025-05-24 ASSESSMENT — COGNITIVE AND FUNCTIONAL STATUS - GENERAL
CLIMB 3 TO 5 STEPS WITH RAILING: A LITTLE
WALKING IN HOSPITAL ROOM: A LITTLE
DAILY ACTIVITIY SCORE: 24
WALKING IN HOSPITAL ROOM: A LITTLE
MOBILITY SCORE: 22
CLIMB 3 TO 5 STEPS WITH RAILING: A LITTLE

## 2025-05-24 ASSESSMENT — PAIN - FUNCTIONAL ASSESSMENT
PAIN_FUNCTIONAL_ASSESSMENT: 0-10

## 2025-05-24 ASSESSMENT — PAIN SCALES - GENERAL
PAINLEVEL_OUTOF10: 0 - NO PAIN

## 2025-05-24 NOTE — PROGRESS NOTES
"CARDIAC ICU PROGRESS NOTE    Tenzin Bates/[id number]    Admit Date: 5/23/2025  Hospital Length of Stay: 1   ICU Length of Stay: 17h     Subjective   75 yo PMHx significant for R. 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 Now s/p PCI to RCA. Admitted to the CICU given concern for thrombotic burden during PCI. He was continued on heparin gtt for additional 24 hours and discontinued this AM per interventional attending. He remains chest pain free and stable for transfer to floor.     To Do:   [ ] Life vest at discharge   [ ] Pending DVT US   [ ] Consider initiating GDMT given new drop in EF        Objective    VITALS:   Visit Vitals  /80   Pulse 83   Temp 36.6 °C (97.9 °F) (Temporal)   Resp 18   Ht 1.803 m (5' 11\")   Wt 114 kg (251 lb 15.8 oz)   SpO2 94%   BMI 35.14 kg/m²   Smoking Status Never   BSA 2.39 m²       Infusions:  heparin, Last Rate: 1,000 Units/hr (05/24/25 0452)        INTAKE/OUTPUT:  I/O last 3 completed shifts:  In: 164.7 (1.4 mL/kg) [I.V.:164.7 (1.4 mL/kg)]  Out: 760 (6.6 mL/kg) [Urine:750 (0.2 mL/kg/hr); Blood:10]  Weight: 114.3 kg      Wt Readings from Last 5 Encounters:   05/24/25 114 kg (251 lb 15.8 oz)   03/28/24 117 kg (258 lb)   08/22/23 112 kg (248 lb)   05/18/23 109 kg (241 lb)       LABS:  CBC:  Recent Labs     05/24/25  0313 05/23/25  1752 05/23/25  1010 08/23/23  1148 05/07/23  0400 05/06/23  0435 05/05/23  0359 05/04/23  1830   WBC 7.7 8.0 8.4 5.5 13.0* 10.0 11.7* 17.8*   HGB 14.7 15.1 16.1 16.6 13.8 13.7 14.3 16.7   HCT 42.2 42.6 45.1 48.3 40.6* 39.3* 41.4 48.9    170 170 151 172 146* 126* 156   MCV 93 93 90 94 92 94 93 94     CMP:  Recent Labs     05/24/25  0313 05/23/25  1752 05/23/25  1010 08/23/23  1148 05/06/23  0435 05/05/23  0359 05/04/23  1830    137 136 141 138 137 136   K 4.1 4.0 4.3 4.4 4.2 4.2 4.1    101 102 104 108* 106 103   CO2 26 24 24 29 23 22 23   ANIONGAP 12 16 14 12 11 13 14 " "  BUN 16 16 17 16 22 20 19   CREATININE 1.01 1.00 1.13 0.83 1.09 1.11 1.36*   EGFR 78 78 68  --   --   --   --    MG 2.04 1.95 2.19  --  2.08  --   --      Recent Labs     05/24/25  0313 05/23/25  1752 05/23/25  1010 05/06/23  0435 05/04/23  1830   ALBUMIN 3.6 3.7 4.2 3.0* 3.7   ALT  --   --  32 28 24   AST  --   --  52* 23 20   BILITOT  --   --  1.2 0.6 1.9*     COAG:   Recent Labs     05/23/25 1752 05/04/23 2006   INR 1.5* 1.3*     ABO:   Recent Labs     05/23/25 1752   ABO A     HEME/ENDO:No results for input(s): \"FERRITIN\", \"IRONSAT\", \"TSH\", \"HGBA1C\" in the last 39631 hours.   CARDIAC:   Recent Labs     05/23/25  1104 05/23/25  1010 05/04/23  1937 05/04/23  1830   TROPHS 7,904* 8,378* 1,045* 465*   BNP  --  142*  --  50     Recent Labs     05/04/23  2233 05/04/23  2110   SO2MV 71 54   O2CMX 70.4 52.6     No results for input(s): \"TACROLIMUS\", \"SIROLIMUS\", \"CYCLOSPORINE\" in the last 98022 hours.    Results from last 7 days   Lab Units 05/24/25 0313 05/23/25 1752 05/23/25  1010   WBC AUTO x10*3/uL 7.7 8.0 8.4   HEMOGLOBIN g/dL 14.7 15.1 16.1   HEMATOCRIT % 42.2 42.6 45.1   PLATELETS AUTO x10*3/uL 162 170 170     Results from last 7 days   Lab Units 05/24/25 0313 05/23/25 1752 05/23/25  1010   SODIUM mmol/L 137 137 136   POTASSIUM mmol/L 4.1 4.0 4.3   CO2 mmol/L 26 24 24   ANION GAP mmol/L 12 16 14   BUN mg/dL 16 16 17   CREATININE mg/dL 1.01 1.00 1.13   GLUCOSE mg/dL 91 77 90   EGFR mL/min/1.73m*2 78 78 68   MAGNESIUM mg/dL 2.04 1.95 2.19   PHOSPHORUS mg/dL 4.3 3.4  --       Results from last 7 days   Lab Units 05/23/25  1010   ALT U/L 32   AST U/L 52*   ALK PHOS U/L 58      Results from last 7 days   Lab Units 05/23/25  1752   INR  1.5*             Results from last 7 days   Lab Units 05/23/25  1752   LACTATE mmol/L 1.5           Results from last 7 days   Lab Units 05/23/25  1104 05/23/25  1010   TROPHSCMC ng/L 7,904* 8,378*       No results found for: \"CKTOTAL\", \"CKMB\", \"CKMBINDEX\", \"TROPONINI\"   No " "results found for: \"HGBA1C\"   No results found for: \"CHOL\"  No results found for: \"HDL\"  No results found for: \"LDLCALC\"  No results found for: \"TRIG\"  No components found for: \"CHOLHDL\"     IMAGING:   Imaging  XR abdomen 1 view  Result Date: 5/24/2025  1. Nonobstructive bowel gas pattern.   I personally reviewed the images/study and I agree with the findings as stated by Vasiliy Jesus MD. This study was interpreted at [facility], [place], [place].   MACRO: None   Signed by: Rome Donis 5/24/2025 9:13 AM Dictation workstation:   [id number]    XR chest 2 views  Result Date: 5/23/2025  No evidence of acute intrathoracic abnormality.   Signed by: Braxton Aguirre 5/23/2025 10:20 AM Dictation workstation:   [id number]      Cardiology, Vascular, and Other Imaging  ECG 12 lead  Result Date: 5/23/2025  Sinus rhythm with Premature atrial complexes Inferior infarct (cited on or before 23-MAY-2025) Cannot rule out Anterior infarct (cited on or before 23-MAY-2025) Abnormal ECG When compared with ECG of 23-MAY-2025 09:37, No significant change was found See ED provider note for full interpretation and clinical correlation Confirmed by Toyin Louis ([id number]) on 5/23/2025 8:24:27 PM    ECG 12 lead  Result Date: 5/23/2025  Sinus rhythm with Premature atrial complexes Inferior infarct , age undetermined Possible Anterior infarct , age undetermined Abnormal ECG When compared with ECG of 05-MAY-2023 02:36, PREVIOUS ECG IS PRESENT See ED provider note for full interpretation and clinical correlation Confirmed by Toyin Louis ([id number]) on 5/23/2025 8:24:13 PM    Transthoracic Echo Complete  Result Date: 5/23/2025   [facility], [address] [place] [address]                Tel [phone] and Fax [phone] TRANSTHORACIC ECHOCARDIOGRAM REPORT  Patient Name:       CRYS IRELAND    Reading Physician:    [id number] Zana Davenport                                                      "          MD Study Date:         5/23/2025           Ordering Provider:    [id number] JOVON RODRIGUEZ MRN/PID:            [id number]            Fellow:               [id number] Elizabeth Estrada MD Accession#:         [id number]        Nurse: Date of Birth/Age:  1950 / 74     Sonographer:          Jose L Jinayessy jurado                                     RDCS, RVT Gender assigned at  M                   Additional Staff: Birth: Height:             180.34 cm           Admit Date:           5/23/2025 Weight:             117.03 kg           Admission Status:     Inpatient - STAT BSA / BMI:          2.35 m2 / 35.98     Encounter#:           [id number]                     kg/m2 Blood Pressure:     120/90 mmHg         Department Location:  [facility]                                                               Non Invasive Study Type:    TRANSTHORACIC ECHO (TTE) COMPLETE Diagnosis/ICD: Chest pain, unspecified-R07.9; Shortness of breath-R06.02;                Elevated Troponin-R79.89 Indication:    elevated troponin, chest pain, shortness of breath CPT Code:      Echo Complete w Full Doppler-[id number] Patient History: Pertinent History: MI, DVT, PE, obesity. Study Detail: The following Echo studies were performed: 2D, M-Mode, Doppler and               color flow. Optison used as a contrast agent for endocardial               border definition and agitated saline used as a contrast agent for               intraseptal flow evaluation. Total contrast used for this               procedure was 1.5 mL via IV push.  PHYSICIAN INTERPRETATION: Left Ventricle: Left ventricular ejection fraction is moderately decreased, by visual estimate at 30-35%. There are no regional left ventricular wall motion abnormalities. The left ventricular cavity size is normal. There is normal septal and normal posterior  left ventricular wall thickness. Spectral Doppler shows a Grade I (impaired relaxation pattern) of left ventricular diastolic filling with normal left atrial filling pressure. There is septal-apical and inferolateral hypokinesia. Left Atrium: The left atrial size is normal. A bubble study using agitated saline was performed. Bubble study is positive. A moderate PFO (11-20 bubbles) was demonstrated. Right Ventricle: The right ventricle is normal in size. There is normal right ventricular global systolic function. Right Atrium: The right atrium is normal in size. Aortic Valve: The aortic valve is trileaflet. The aortic valve dimensionless index is 0.38. There is no evidence of aortic valve regurgitation. The peak instantaneous gradient of the aortic valve is 12 mmHg. The mean gradient of the aortic valve is 7 mmHg. Mitral Valve: The mitral valve is normal in structure. There is trace mitral valve regurgitation. Tricuspid Valve: The tricuspid valve is structurally normal. There is trace to mild tricuspid regurgitation. The Doppler estimated RVSP is within normal limits at 23.9 mmHg. Pulmonic Valve: The pulmonic valve is structurally normal. There is physiologic pulmonic valve regurgitation. Pericardium: Trivial pericardial effusion. There is a pericardial fat pad present. Aorta: The aortic root is normal. There is mild dilatation of the aortic root. Systemic Veins: The inferior vena cava appears normal in size, with IVC inspiratory collapse greater than 50%. In comparison to the previous echocardiogram(s): Compared with study dated 8/10/2023, the EF now appears to be 30-35% compared to 45-50% on prior study.  CONCLUSIONS:  1. Left ventricular ejection fraction is moderately decreased, by visual estimate at 30-35%.  2. Spectral Doppler shows a Grade I (impaired relaxation pattern) of left ventricular diastolic filling with normal left atrial filling pressure.  3. There is normal right ventricular global systolic  function.  4. Right ventricular systolic pressure is within normal limits.  5. A bubble study using agitated saline was performed. Bubble study is positive. A moderate PFO (11-20 bubbles) was demonstrated. QUANTITATIVE DATA SUMMARY:  2D MEASUREMENTS:          Normal Ranges: IVSd:            0.78 cm  (0.6-1.1cm) LVPWd:           0.88 cm  (0.6-1.1cm) LVIDd:           5.27 cm  (3.9-5.9cm) LVIDs:           4.37 cm LV Mass Index:   67 g/m2 LVEDV Index:     46 ml/m2 LV % FS          17.1 %  LEFT ATRIUM:                  Normal Ranges: LA Vol A4C:        53.5 ml    (22+/-6mL/m2) LA Vol A2C:        25.6 ml LA Vol BP:         37.7 ml LA Vol Index A4C:  22.8ml/m2 LA Vol Index A2C:  10.9 ml/m2 LA Vol Index BP:   16.0 ml/m2 LA Area A4C:       18.6 cm2 LA Area A2C:       13.1 cm2 LA Major Axis A4C: 5.5 cm LA Major Axis A2C: 5.7 cm  RIGHT ATRIUM:          Normal Ranges: RA Area A4C:  17.9 cm2  LV SYSTOLIC FUNCTION:                      Normal Ranges: EF-A4C View:    31 % (>=55%) EF-A2C View:    39 % EF-Biplane:     34 % EF-Visual:      33 % LV EF Reported: 33 %  LV DIASTOLIC FUNCTION:            Normal Ranges: MV Peak E:             0.59 m/s   (0.7-1.2 m/s) MV Peak A:             0.88 m/s   (0.42-0.7 m/s) E/A Ratio:             0.67       (1.0-2.2) MV e'                  0.080 m/s  (>8.0) MV lateral e'          0.08 m/s MV medial e'           0.08 m/s E/e' Ratio:            7.36       (<8.0) PulmV Sys Lino:         29.80 cm/s  MITRAL VALVE:          Normal Ranges: MV DT:        175 msec (150-240msec)  AORTIC VALVE:                      Normal Ranges: AoV Vmax:                1.72 m/s  (<=1.7m/s) AoV Peak P.8 mmHg (<20mmHg) AoV Mean P.8 mmHg  (1.7-11.5mmHg) LVOT Max Lino:            0.94 m/s  (<=1.1m/s) AoV VTI:                 32.14 cm  (18-25cm) LVOT VTI:                12.17 cm LVOT Diameter:           2.24 cm   (1.8-2.4cm) AoV Area, VTI:           1.49 cm2  (2.5-5.5cm2) AoV Area,Vmax:            2.16 cm2  (2.5-4.5cm2) AoV Dimensionless Index: 0.38  RIGHT VENTRICLE: RV Basal 3.37 cm RV Mid   2.75 cm RV Major 6.9 cm TAPSE:   18.0 mm RV s'    0.07 m/s  TRICUSPID VALVE/RVSP:          Normal Ranges: Peak TR Velocity:     2.29 m/s RV Syst Pressure:     24 mmHg  (< 30mmHg) IVC Diam:             1.23 cm  PULMONIC VALVE:          Normal Ranges: PV Accel Time:  99 msec  (>120ms) PV Max Lino:     1.1 m/s  (0.6-0.9m/s) PV Max P.0 mmHg  PULMONARY VEINS: PulmV Sys Lino: 29.80 cm/s  AORTA: Asc Ao Diam 3.72 cm  [id number] Zana Davenport MD Electronically signed on 2025 at 2:36:57 PM  ** Final **     Point of Care Ultrasound  Result Date: 2025  Yumiko Castelan MD     2025  3:07 PM Performed by: Yumiko Castelan MD Authorized by: Sidra Pierre DO  Cardiac Indications: chest pain Procedure: Cardiac Ultrasound Findings:  Views: parasternal long, parasternal short and apical four The pericardial space was visualized and was NEGATIVE for a significant pericardial effusion. Activity: Ventricular contractions were visualized. LV: LV systolic function was DECREASED. RV: RV size was NORMAL. Impression: Cardiac: The focused cardiac ultrasound exam had ABNORMAL findings as specified.          PHYSICAL EXAM:  Constitutional: No acute distress, cooperative   HEENT: No conjunctival icterus, EOMI grossly intact   Cardiovascular: RRR, normal S1/S2, no murmurs noted  Pulmonary: Clear to auscultation b/l, no wheezes/crackles/rhonchi, no increased work of breathing on RA   GI: Soft, non-tender, non-distended, normoactive bowel sounds  Lower extremities: Warm and well perfused, L > R BL LE   Neuro: A&O x3, able to move all 4 extremities spontaneously  Psych: Appropriate mood and affect       MEDICATIONS:   Scheduled medications  Scheduled Medications[1]    Continuous medications  Continuous Medications[2]    PRN medications  PRN Medications[3]          Assessment/Plan   75 yo [employer] with PMHx significant for KENDRICK  DVT/PE 5/2023 s/p thrombectomy, HFrEF, Obesity, HLD who presents to the ED on 5/23 with SOB, CP and bilateral leg swelling of 1 week duration. Trops elevated and EKG without overt ischemic features, TTE with EF of 30-35%(vs 45% 5/2023), presentation c/f NSTEMI. Other differentials for his CP/SOB are DVT/PE(I/s/o hx of DVT/PE, elevated D-dimers, although unspecific) and heart failure exacerbation although less likely(I/s/o reduced EF, b/l feet swelling and bibasal crepitations). Patient will get urgent Cath for NSTEMI and reduced EF. Now s/p PCI to RCA. Admitted to CICU.     Updates 5/24:   - Turned off heparin gtt  - Life vest ordered   - Pending DVT US      Neuro:   ROHINI     Cardiac:     #NSTEMI s/p PCI to RCA   #HFrEF  :: CP on exertion for 1 week  ::EKG without ST elevation  ::Trops 8378-->7904  ::TTE: EF 30-35 without RWMA  ::S/p ASA load and Heparin drip  :: LHC with 100% stenosis, pre intervention MARISOL 0 flow s/p LM stent to RCA  PLAN:  -c/w Heparin drip 2 hours after TR band removed   -Atorvastatin 40 mg daily  - Loaded with ASA and brillinta in cath lab  - Will start ASA + brilinta BID      #HFrEF  #HLD  ::Hx of feet swelling and SOB  ::BNP:142  ::CXR w/out cardiomegaly  ::TTE with EF of 30-35% vs 45(5/2023)  PLAN  -lipid profile ordered  -Atorvastatin 40 mg daily  -diurese prn  - Will consider initiating GDMT      #Hx of PE/DVT 5/23  #C/f PE  :: R DVT right distal femoral and popliteal vein   ::Heart strain from PE, Rt heart cath with pulmonary thrombectomy  ::Echo at that time EF 45-50%   ::was on Apixaban, stopped 8 weeks ago  ::D-dime 897, TTE without RH strain  PLAN  -Consider CTPE  -DVT of b/l LE pending   -Heparin drip     Respiratory:   ROHINI     GI:   ROHINI     Renal:   ROHINI      ID:   ROHINI      F:Caution  E:K>4, Mg>2  N:Cardiac diet  DVT ppx: heparin drip  GI ppx: not indicated  Bowel care: Miralax     Code Status: Full code for now as he's getting a procedure (WILL BE REVERSED TO DNR/DNI 24 hours after  procedure per patient wishes)   NOK:Asad Ireland(Wife) [phone]       Code Status: Full Code (confirmed on admission)   NOK: Extended Emergency Contact Information  Primary Emergency Contact: Ivory Villegas  Mobile Phone: [phone]  Relation: Daughter  Secondary Emergency Contact: YO IRELAND  Mobile Phone: [phone]  Relation: Spouse             [1] aspirin, 81 mg, oral, Daily  atorvastatin, 40 mg, oral, Nightly  perflutren protein A microsphere, 0.5 mL, intravenous, Once in imaging  ticagrelor, 90 mg, oral, BID  [2] heparin, 0-4,000 Units/hr, Last Rate: 1,000 Units/hr (05/24/25 0450)  [3] PRN medications: heparin

## 2025-05-24 NOTE — PROGRESS NOTES
Tenzin Bates is a 74 y.o. male on day 1 of admission presenting with NSTEMI (non-ST elevated myocardial infarction) (Multi).      Subjective   Pt seen and examined in CICU after PCI to RCA. Is appropriate for RNF and is returning to [facility] service. Pt has no current chest pain and denies any SOB. He endorses L > R leg swelling.       Objective     Last Recorded Vitals  /79   Pulse 88   Temp 35.9 °C (96.6 °F) (Temporal)   Resp (!) 27   Wt 114 kg (251 lb 15.8 oz)   SpO2 94%   Intake/Output last 3 Shifts:    Intake/Output Summary (Last 24 hours) at 5/24/2025 1718  Last data filed at 5/24/2025 1500  Gross per 24 hour   Intake 718.5 ml   Output 1460 ml   Net -741.5 ml       Admission Weight  Weight: 117 kg (258 lb) (05/23/25 0932)    Daily Weight  05/24/25 : 114 kg (251 lb 15.8 oz)    Image Results  XR abdomen 1 view  Narrative: Interpreted By:  Rome Donis  and Ann Marie Amanda   STUDY:  XR ABDOMEN 1 VIEW;  5/23/2025 7:43 pm      INDICATION:  Signs/Symptoms:Emesis.      COMPARISON:  CT ANGIO CHEST FOR PE 5/4/2023      ACCESSION NUMBER(S):  [id number]      ORDERING CLINICIAN:  VICTORIA LOERA      FINDINGS:      Nonobstructive bowel gas pattern. Limited evaluation of  pneumoperitoneum on supine imaging, however no gross evidence of free  air is noted.      Visualized lungs are clear. Contrast is seen in nondistended renal  collecting systems. Contrast is seen within the colon. Small bladder  diverticulum.      Osseous structures demonstrate no acute bony changes. Multilevel  degenerative changes of the lumbar spine.      Impression: 1. Nonobstructive bowel gas pattern.      I personally reviewed the images/study and I agree with the findings  as stated by Vasiliy Jesus MD. This study was interpreted at  [facility], [place], [place].      MACRO:  None      Signed by: Rome Donis 5/24/2025 9:13 AM  Dictation workstation:   [id number]      Physical Exam    Constitutional: No acute distress, resting comfortably in bed, cooperative  HEENT: Normocephalic, atraumatic, PERRLA, EOMI, moist mucous membranes, no pharyngeal erythema  Cardiovascular: RRR, normal S1/S2, no murmurs noted  Pulmonary: Clear to auscultation b/l, no wheezes/crackles/rhonchi, no increased work of breathing, no supplemental oxygen  GI: Soft, non-tender, non-distended, normoactive bowel sounds  : No garcia catheter  Lower extremities: Warm and well perfused, no lower extremity edema  Neuro: A&O x3, able to move all 4 extremities spontaneously, normal gait  Psych: Appropriate mood and affect         Relevant Results  Scheduled medications  Scheduled Medications[1]  Continuous medications  Continuous Medications[2]  PRN medications  PRN Medications[3]       Assessment & Plan  NSTEMI (non-ST elevated myocardial infarction) (Multi)    Mr Fernandez is a 73 yo M w/ PMHx significant for 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 Trops elevated and EKG without overt ischemic features, TTE with EF of 30-35%(vs 45% 5/2023), presentation c/f NSTEMI. Other differentials for his CP/SOB are DVT/PE(I/s/o hx of DVT/PE, elevated D-dimers, although unspecific) and heart failure exacerbation although less likely (I/s/o reduced EF, b/l feet swelling and bibasal crepitations). Patient underwent urgent Cath for NSTEMI and reduced EF. Now s/p PCI to RCA, was admitted to CICU following procedure but is now stable for return to the [facility] service.    Updates 5/24  -discontinued heparin gtt in CICU  -life vest ordered by CICU team  [ ] pending DVT US given asymmetric leg swelling      #NSTEMI s/p PCI to RCA   #HFrEF  :: CP on exertion for 1 week  ::EKG without ST elevation  ::Trops 8378-->7904  ::TTE: EF 30-35 without RWMA  ::S/p ASA load and Heparin drip  :: LHC with 100% stenosis, pre intervention MARISOL 0 flow s/p LM  stent to RCA  PLAN:  -Atorvastatin 40 mg daily  - Loaded with ASA and brillinta in cath lab  - Will start ASA + brilinta BID      #HFrEF  #HLD  ::Hx of feet swelling and SOB  ::BNP:142  ::CXR w/out cardiomegaly  ::TTE with EF of 30-35% vs 45(5/2023)  PLAN  -lipid profile ordered  -Atorvastatin 40 mg daily  -diurese prn  - Will consider initiating GDMT      #Hx of PE/DVT 5/23  #C/f PE  :: R DVT right distal femoral and popliteal vein   ::Heart strain from PE, Rt heart cath with pulmonary thrombectomy  ::Echo at that time EF 45-50%   ::was on Apixaban, stopped 8 weeks ago  ::D-dimer 897, TTE without RH strain  PLAN  -Consider CTPE if clinically indicated  -DVT of b/l LE pending       F:Caution  E:K>4, Mg>2  N:Cardiac diet  A: pIV x2       Code Status: DNR/DNI; discussed with patient following PCI on 5/24 and pt confirmed DNR/DNI was his code status following the procedure  NOK: Asad Bates(Wife) [phone]      Meg Naik MD  Internal Med PGY-1         [1] aspirin, 81 mg, oral, Daily  atorvastatin, 40 mg, oral, Nightly  calcium carbonate, 500 mg of calcium carbonate, oral, Daily  pantoprazole, 40 mg, oral, Daily before breakfast  perflutren protein A microsphere, 0.5 mL, intravenous, Once in imaging  ticagrelor, 90 mg, oral, BID  [2]    [3]

## 2025-05-25 ENCOUNTER — DOCUMENTATION (OUTPATIENT)
Dept: HOME HEALTH SERVICES | Facility: HOME HEALTH | Age: 75
End: 2025-05-25
Payer: MEDICARE

## 2025-05-25 ENCOUNTER — APPOINTMENT (OUTPATIENT)
Dept: RADIOLOGY | Facility: HOSPITAL | Age: 75
DRG: 322 | End: 2025-05-25
Payer: MEDICARE

## 2025-05-25 VITALS
RESPIRATION RATE: 18 BRPM | HEART RATE: 90 BPM | TEMPERATURE: 97.3 F | WEIGHT: 248.02 LBS | OXYGEN SATURATION: 93 % | BODY MASS INDEX: 34.72 KG/M2 | HEIGHT: 71 IN | SYSTOLIC BLOOD PRESSURE: 126 MMHG | DIASTOLIC BLOOD PRESSURE: 75 MMHG

## 2025-05-25 LAB
ALBUMIN SERPL BCP-MCNC: 3.8 G/DL (ref 3.4–5)
ANION GAP SERPL CALC-SCNC: 13 MMOL/L (ref 10–20)
BASOPHILS # BLD AUTO: 0.04 X10*3/UL (ref 0–0.1)
BASOPHILS NFR BLD AUTO: 0.4 %
BUN SERPL-MCNC: 21 MG/DL (ref 6–23)
CALCIUM SERPL-MCNC: 8.9 MG/DL (ref 8.6–10.6)
CHLORIDE SERPL-SCNC: 103 MMOL/L (ref 98–107)
CHOLEST SERPL-MCNC: 125 MG/DL (ref 0–199)
CHOLESTEROL/HDL RATIO: 4.4
CO2 SERPL-SCNC: 25 MMOL/L (ref 21–32)
CREAT SERPL-MCNC: 1.17 MG/DL (ref 0.5–1.3)
EGFRCR SERPLBLD CKD-EPI 2021: 65 ML/MIN/1.73M*2
EOSINOPHIL # BLD AUTO: 0.07 X10*3/UL (ref 0–0.4)
EOSINOPHIL NFR BLD AUTO: 0.7 %
ERYTHROCYTE [DISTWIDTH] IN BLOOD BY AUTOMATED COUNT: 11.5 % (ref 11.5–14.5)
EST. AVERAGE GLUCOSE BLD GHB EST-MCNC: 82 MG/DL
GLUCOSE SERPL-MCNC: 107 MG/DL (ref 74–99)
HBA1C MFR BLD: 4.5 % (ref ?–5.7)
HCT VFR BLD AUTO: 44.4 % (ref 41–52)
HDLC SERPL-MCNC: 28.2 MG/DL
HGB BLD-MCNC: 15.2 G/DL (ref 13.5–17.5)
IMM GRANULOCYTES # BLD AUTO: 0.04 X10*3/UL (ref 0–0.5)
IMM GRANULOCYTES NFR BLD AUTO: 0.4 % (ref 0–0.9)
LDLC SERPL CALC-MCNC: 77 MG/DL
LYMPHOCYTES # BLD AUTO: 1.19 X10*3/UL (ref 0.8–3)
LYMPHOCYTES NFR BLD AUTO: 12.2 %
MAGNESIUM SERPL-MCNC: 2.09 MG/DL (ref 1.6–2.4)
MCH RBC QN AUTO: 32 PG (ref 26–34)
MCHC RBC AUTO-ENTMCNC: 34.2 G/DL (ref 32–36)
MCV RBC AUTO: 94 FL (ref 80–100)
MONOCYTES # BLD AUTO: 0.76 X10*3/UL (ref 0.05–0.8)
MONOCYTES NFR BLD AUTO: 7.8 %
NEUTROPHILS # BLD AUTO: 7.62 X10*3/UL (ref 1.6–5.5)
NEUTROPHILS NFR BLD AUTO: 78.5 %
NON HDL CHOLESTEROL: 97 MG/DL (ref 0–149)
NRBC BLD-RTO: 0 /100 WBCS (ref 0–0)
PHOSPHATE SERPL-MCNC: 3.4 MG/DL (ref 2.5–4.9)
PLATELET # BLD AUTO: 177 X10*3/UL (ref 150–450)
POTASSIUM SERPL-SCNC: 3.8 MMOL/L (ref 3.5–5.3)
RBC # BLD AUTO: 4.75 X10*6/UL (ref 4.5–5.9)
SODIUM SERPL-SCNC: 137 MMOL/L (ref 136–145)
TRIGL SERPL-MCNC: 100 MG/DL (ref 0–149)
VLDL: 20 MG/DL (ref 0–40)
WBC # BLD AUTO: 9.7 X10*3/UL (ref 4.4–11.3)

## 2025-05-25 PROCEDURE — 83036 HEMOGLOBIN GLYCOSYLATED A1C: CPT

## 2025-05-25 PROCEDURE — 2500000002 HC RX 250 W HCPCS SELF ADMINISTERED DRUGS (ALT 637 FOR MEDICARE OP, ALT 636 FOR OP/ED)

## 2025-05-25 PROCEDURE — 80061 LIPID PANEL: CPT

## 2025-05-25 PROCEDURE — 99232 SBSQ HOSP IP/OBS MODERATE 35: CPT

## 2025-05-25 PROCEDURE — 2500000001 HC RX 250 WO HCPCS SELF ADMINISTERED DRUGS (ALT 637 FOR MEDICARE OP)

## 2025-05-25 PROCEDURE — 2500000002 HC RX 250 W HCPCS SELF ADMINISTERED DRUGS (ALT 637 FOR MEDICARE OP, ALT 636 FOR OP/ED): Performed by: STUDENT IN AN ORGANIZED HEALTH CARE EDUCATION/TRAINING PROGRAM

## 2025-05-25 PROCEDURE — 2500000004 HC RX 250 GENERAL PHARMACY W/ HCPCS (ALT 636 FOR OP/ED): Mod: JZ | Performed by: STUDENT IN AN ORGANIZED HEALTH CARE EDUCATION/TRAINING PROGRAM

## 2025-05-25 PROCEDURE — 93971 EXTREMITY STUDY: CPT | Performed by: RADIOLOGY

## 2025-05-25 PROCEDURE — 80069 RENAL FUNCTION PANEL: CPT

## 2025-05-25 PROCEDURE — 85025 COMPLETE CBC W/AUTO DIFF WBC: CPT

## 2025-05-25 PROCEDURE — 93970 EXTREMITY STUDY: CPT

## 2025-05-25 PROCEDURE — 83735 ASSAY OF MAGNESIUM: CPT

## 2025-05-25 PROCEDURE — 36415 COLL VENOUS BLD VENIPUNCTURE: CPT

## 2025-05-25 PROCEDURE — 2500000001 HC RX 250 WO HCPCS SELF ADMINISTERED DRUGS (ALT 637 FOR MEDICARE OP): Performed by: STUDENT IN AN ORGANIZED HEALTH CARE EDUCATION/TRAINING PROGRAM

## 2025-05-25 RX ORDER — SPIRONOLACTONE 25 MG/1
12.5 TABLET ORAL DAILY
Qty: 15 TABLET | Refills: 1 | Status: SHIPPED | OUTPATIENT
Start: 2025-05-26 | End: 2025-07-25

## 2025-05-25 RX ORDER — ATORVASTATIN CALCIUM 40 MG/1
40 TABLET, FILM COATED ORAL NIGHTLY
Qty: 30 TABLET | Refills: 1 | Status: SHIPPED | OUTPATIENT
Start: 2025-05-25 | End: 2025-07-24

## 2025-05-25 RX ORDER — ONDANSETRON HYDROCHLORIDE 2 MG/ML
4 INJECTION, SOLUTION INTRAVENOUS EVERY 6 HOURS PRN
Status: DISCONTINUED | OUTPATIENT
Start: 2025-05-25 | End: 2025-05-25 | Stop reason: HOSPADM

## 2025-05-25 RX ORDER — CALCIUM CARBONATE 200(500)MG
1 TABLET,CHEWABLE ORAL DAILY
Qty: 30 TABLET | Refills: 1 | Status: SHIPPED | OUTPATIENT
Start: 2025-05-26

## 2025-05-25 RX ORDER — ATORVASTATIN CALCIUM 40 MG/1
40 TABLET, FILM COATED ORAL NIGHTLY
Qty: 30 TABLET | Refills: 1 | Status: CANCELLED | OUTPATIENT
Start: 2025-05-25 | End: 2025-07-24

## 2025-05-25 RX ORDER — NAPROXEN SODIUM 220 MG/1
81 TABLET, FILM COATED ORAL DAILY
Qty: 30 TABLET | Refills: 1 | Status: SHIPPED | OUTPATIENT
Start: 2025-05-26 | End: 2025-07-25

## 2025-05-25 RX ORDER — ATORVASTATIN CALCIUM 40 MG/1
40 TABLET, FILM COATED ORAL NIGHTLY
Status: DISCONTINUED | OUTPATIENT
Start: 2025-05-25 | End: 2025-05-25 | Stop reason: HOSPADM

## 2025-05-25 RX ORDER — METOPROLOL TARTRATE 25 MG/1
12.5 TABLET, FILM COATED ORAL 2 TIMES DAILY
Status: DISCONTINUED | OUTPATIENT
Start: 2025-05-25 | End: 2025-05-25 | Stop reason: HOSPADM

## 2025-05-25 RX ORDER — ENOXAPARIN SODIUM 100 MG/ML
40 INJECTION SUBCUTANEOUS DAILY
Status: DISCONTINUED | OUTPATIENT
Start: 2025-05-25 | End: 2025-05-25 | Stop reason: HOSPADM

## 2025-05-25 RX ORDER — TICAGRELOR 90 MG/1
90 TABLET, FILM COATED ORAL 2 TIMES DAILY
Qty: 60 TABLET | Refills: 1 | Status: SHIPPED | OUTPATIENT
Start: 2025-05-25

## 2025-05-25 RX ORDER — METOPROLOL TARTRATE 25 MG/1
12.5 TABLET, FILM COATED ORAL 2 TIMES DAILY
Qty: 30 TABLET | Refills: 1 | Status: SHIPPED | OUTPATIENT
Start: 2025-05-25

## 2025-05-25 RX ORDER — ATORVASTATIN CALCIUM 80 MG/1
80 TABLET, FILM COATED ORAL NIGHTLY
Status: DISCONTINUED | OUTPATIENT
Start: 2025-05-25 | End: 2025-05-25

## 2025-05-25 RX ORDER — SPIRONOLACTONE 25 MG/1
12.5 TABLET ORAL DAILY
Status: DISCONTINUED | OUTPATIENT
Start: 2025-05-25 | End: 2025-05-25 | Stop reason: HOSPADM

## 2025-05-25 RX ADMIN — ENOXAPARIN SODIUM 40 MG: 100 INJECTION SUBCUTANEOUS at 08:15

## 2025-05-25 RX ADMIN — METOPROLOL TARTRATE 12.5 MG: 25 TABLET, FILM COATED ORAL at 09:20

## 2025-05-25 RX ADMIN — TICAGRELOR 90 MG: 90 TABLET ORAL at 08:15

## 2025-05-25 RX ADMIN — CALCIUM CARBONATE (ANTACID) CHEW TAB 500 MG 1 TABLET: 500 CHEW TAB at 08:15

## 2025-05-25 RX ADMIN — SACUBITRIL AND VALSARTAN 0.5 TABLET: 24; 26 TABLET, FILM COATED ORAL at 09:20

## 2025-05-25 RX ADMIN — SPIRONOLACTONE 12.5 MG: 25 TABLET, FILM COATED ORAL at 09:20

## 2025-05-25 RX ADMIN — EMPAGLIFLOZIN 10 MG: 10 TABLET, FILM COATED ORAL at 09:19

## 2025-05-25 RX ADMIN — ASPIRIN 81 MG: 81 TABLET, CHEWABLE ORAL at 08:15

## 2025-05-25 RX ADMIN — PANTOPRAZOLE SODIUM 40 MG: 40 TABLET, DELAYED RELEASE ORAL at 06:04

## 2025-05-25 ASSESSMENT — COGNITIVE AND FUNCTIONAL STATUS - GENERAL
MOBILITY SCORE: 22
WALKING IN HOSPITAL ROOM: A LITTLE
CLIMB 3 TO 5 STEPS WITH RAILING: A LITTLE
DAILY ACTIVITIY SCORE: 24

## 2025-05-25 ASSESSMENT — PAIN - FUNCTIONAL ASSESSMENT
PAIN_FUNCTIONAL_ASSESSMENT: 0-10
PAIN_FUNCTIONAL_ASSESSMENT: 0-10

## 2025-05-25 ASSESSMENT — PAIN SCALES - GENERAL
PAINLEVEL_OUTOF10: 0 - NO PAIN
PAINLEVEL_OUTOF10: 0 - NO PAIN

## 2025-05-25 NOTE — PROGRESS NOTES
Spoke with patient patient agreeable to home care referral being sent to [facility] home [facility] patient also stated he didn't think he needed it but would take it medical team notified

## 2025-05-25 NOTE — HOSPITAL COURSE
Tenzin Bates is a 74 y.o. 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     Patient says over the past week he's been experiencing central chest pain worse with exertion and with coughing, pain has been getting worse with time. Cough was non productive and not associated with fever, chills or hemoptysis. Patient admited to b/l feet swelling worse on the left over the past week as well. Patient says over the past week, he has been driving from [place] to [place] to see his wife who has been admitted to . He was on apixaban for a previous DVT/PE but stopped about 8 weeks ago on the advise on his doctor. A CTA done on Feb 2, 2025 showed no PEs. He admited to a positive Fhx of DVT in her mother and denies smoking, alcohol or recreational drug use. He denied dizziness, palpitations, orthopnea, or PND. Patient reported not taking any chronic medications except for some vitamins.    ED Course:  Triage vitals:  T 36.4 °C (97.5 °F)  HR 98  /74  RR 16  O2 96 % None (Room air)     ED labs:  CBC Wcc 8.4, hb 16.1, plt 170  CMP, wnl except AST 52  , D-dimer 897,  Trop 8378-->7904     EKG: without ischemic changes  CXR: No acute cardiopulmonary findings  POCUS: Decreased LV systolic function    Urgent LHC showed RCA with total thrombotic occlusion originating at the proximal to mid segment. Now Now s/p PCI to RCA. Admitted to the CICU given concern for thrombotic burden during PCI. He was continued on heparin gtt for additional 24 hours and transferred to the floor on 5/24.    On the floor he remained without CP or SOB. DVT US ordered showed......

## 2025-05-25 NOTE — PROGRESS NOTES
Tenzin Bates is a 74 y.o. male on day 2 of admission presenting with NSTEMI (non-ST elevated myocardial infarction) (Multi).      Subjective   NAEO. Pt has no current chest pain and denies any SOB.     Pending DVT US.      Objective     Last Recorded Vitals  /82 (BP Location: Left arm, Patient Position: Lying)   Pulse 97   Temp 36.4 °C (97.5 °F) (Temporal)   Resp 18   Wt 113 kg (248 lb 0.3 oz)   SpO2 93%   Intake/Output last 3 Shifts:    Intake/Output Summary (Last 24 hours) at 5/25/2025 0819  Last data filed at 5/24/2025 1800  Gross per 24 hour   Intake 522.5 ml   Output 675 ml   Net -152.5 ml       Admission Weight  Weight: 117 kg (258 lb) (05/23/25 0932)    Daily Weight  05/25/25 : 113 kg (248 lb 0.3 oz)    Image Results  XR abdomen 1 view  Narrative: Interpreted By:  Rome Donis  and Ann Marie Amanda   STUDY:  XR ABDOMEN 1 VIEW;  5/23/2025 7:43 pm      INDICATION:  Signs/Symptoms:Emesis.      COMPARISON:  CT ANGIO CHEST FOR PE 5/4/2023      ACCESSION NUMBER(S):  [id number]      ORDERING CLINICIAN:  VICTORIA LOERA      FINDINGS:      Nonobstructive bowel gas pattern. Limited evaluation of  pneumoperitoneum on supine imaging, however no gross evidence of free  air is noted.      Visualized lungs are clear. Contrast is seen in nondistended renal  collecting systems. Contrast is seen within the colon. Small bladder  diverticulum.      Osseous structures demonstrate no acute bony changes. Multilevel  degenerative changes of the lumbar spine.      Impression: 1. Nonobstructive bowel gas pattern.      I personally reviewed the images/study and I agree with the findings  as stated by Vasiliy Jesus MD. This study was interpreted at  [facility], [place], [place].      MACRO:  None      Signed by: Rome Donis 5/24/2025 9:13 AM  Dictation workstation:   [id number]      Physical Exam   Constitutional: No acute distress, resting comfortably in bed, cooperative  HEENT:  Normocephalic, atraumatic, PERRLA, EOMI, moist mucous membranes, no pharyngeal erythema  Cardiovascular: RRR, normal S1/S2, no murmurs noted  Pulmonary: Clear to auscultation b/l, no wheezes/crackles/rhonchi, no increased work of breathing, no supplemental oxygen  GI: Soft, non-tender, non-distended, normoactive bowel sounds  : No garcia catheter  Lower extremities: Warm and well perfused, no lower extremity edema  Neuro: A&O x3, able to move all 4 extremities spontaneously, normal gait  Psych: Appropriate mood and affect         Relevant Results  Scheduled medications  Scheduled Medications[1]  Continuous medications  Continuous Medications[2]  PRN medications  PRN Medications[3]       Assessment & Plan  NSTEMI (non-ST elevated myocardial infarction) (Multi)    Mr Fernandez is a 75 yo M w/ PMHx significant for 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 Trops elevated and EKG without overt ischemic features, TTE with EF of 30-35%(vs 45% 5/2023), presentation c/f NSTEMI. Other differentials for his CP/SOB are DVT/PE(I/s/o hx of DVT/PE, elevated D-dimers, although unspecific) and heart failure exacerbation although less likely (I/s/o reduced EF, b/l feet swelling and bibasal crepitations). Patient underwent urgent Cath for NSTEMI and reduced EF. Now s/p PCI to RCA, was admitted to CICU following procedure but is now stable for return to the [facility] service.    Updates 5/25  -GDMT started  -life vest ordered by CICU team, will find out why they ordered. Patient likely doesn't need it  -pending DVT US given asymmetric leg swelling  -possible discharge      #NSTEMI s/p PCI to RCA   #HFrEF  ::CP on exertion for 1 week  ::EKG without ST elevation  ::Trops 8378-->7904  ::TTE: EF 30-35 without RWMA  ::S/p ASA load and Heparin drip  :: LHC with 100% stenosis, pre intervention MARISOL 0 flow s/p LM stent to  RCA  PLAN:  -Atorvastatin 40 mg daily  -Loaded with ASA and brillinta in cath lab  -c/w ASA + brilinta BID      #HFrEF  #HLD  ::Hx of feet swelling and SOB  ::BNP:142, LDL 77  ::CXR w/out cardiomegaly  ::TTE with EF of 30-35% vs 45(5/2023)  PLAN:  -Atorvastatin 40 mg daily  -diurese prn, if overloaded  -Started GDMT:  Empagliflozin 10 mg/d  Aldactone 12.5 mg/d  Metoprolol tartrate 12.5 BID  Entresto 24/26 BID       #Hx of PE/DVT 5/23  #C/f PE  :: R DVT right distal femoral and popliteal vein   ::Heart strain from PE, Rt heart cath with pulmonary thrombectomy  ::CTPE 2/2025 without PE  ::was on Apixaban, stopped 8 weeks ago  ::D-dimer 897, TTE without RH strain  PLAN:  -DVT of b/l LE pending       F:Caution  E:K>4, Mg>2  N:Cardiac diet  A: pIV x2  DVT ppx: Enoxaparin       Code Status: DNR/DNI; discussed with patient following PCI on 5/24 and pt confirmed DNR/DNI was his code status following the procedure  NOK: Asad Bates(Wife) [phone]      Sanjiv Tan MD  Internal Med PGY-1         [1] aspirin, 81 mg, oral, Daily  atorvastatin, 40 mg, oral, Nightly  calcium carbonate, 500 mg of calcium carbonate, oral, Daily  enoxaparin, 40 mg, subcutaneous, Daily  pantoprazole, 40 mg, oral, Daily before breakfast  perflutren protein A microsphere, 0.5 mL, intravenous, Once in imaging  polyethylene glycol, 17 g, oral, Daily  tamsulosin, 0.4 mg, oral, Daily  ticagrelor, 90 mg, oral, BID     [2]    [3]

## 2025-05-25 NOTE — CARE PLAN
Problem: Pain - Adult  Goal: Verbalizes/displays adequate comfort level or baseline comfort level  Outcome: Progressing     Problem: Safety - Adult  Goal: Free from fall injury  Outcome: Progressing     Problem: Discharge Planning  Goal: Discharge to home or other facility with appropriate resources  Outcome: Progressing     Problem: Chronic Conditions and Co-morbidities  Goal: Patient's chronic conditions and co-morbidity symptoms are monitored and maintained or improved  Outcome: Progressing     Problem: Nutrition  Goal: Nutrient intake appropriate for maintaining nutritional needs  Outcome: Progressing     Problem: Fall/Injury  Goal: Not fall by end of shift  Outcome: Progressing  Goal: Be free from injury by end of the shift  Outcome: Progressing  Goal: Verbalize understanding of personal risk factors for fall in the hospital  Outcome: Progressing  Goal: Verbalize understanding of risk factor reduction measures to prevent injury from fall in the home  Outcome: Progressing  Goal: Use assistive devices by end of the shift  Outcome: Progressing  Goal: Pace activities to prevent fatigue by end of the shift  Outcome: Progressing   The patient's goals for the shift include      The clinical goals for the shift include remain HDS throughout shift

## 2025-05-25 NOTE — HH CARE COORDINATION
Home Care received a referral for Physical Therapy, Occupational Therapy, and Home Health Aide. Unfortunately, we are unable to accept and process the referral at this time.    Reason:  Patient's Home is Outside of [facility] Area    Patients, please reach out to the referring provider or your PCP to assist in obtaining an alternative home care agency and/or guidance to meet your needs.    Providers, please reach out to  Home Care at [phone] with any questions regarding the declined referral.

## 2025-05-26 NOTE — DISCHARGE SUMMARY
Discharge Diagnosis  NSTEMI (non-ST elevated myocardial infarction) (Multi)       Issues Requiring Follow-Up  FU with Cardiology    Discharge Meds     Medication List      START taking these medications     aspirin 81 mg chewable tablet; Chew 1 tablet (81 mg) once daily.   calcium carbonate 500 mg (200 mg elemental) chewable tablet; Commonly   known as: Tums; Chew 1 tablet once daily.   empagliflozin 10 mg tablet; Commonly known as: Jardiance; Take 1 tablet   (10 mg) by mouth once daily.   metoprolol tartrate 25 mg tablet; Commonly known as: Lopressor; Take 0.5   tablets (12.5 mg) by mouth 2 times a day.   sacubitriL-valsartan 24-26 mg tablet; Commonly known as: Entresto; Take   0.5 tablets by mouth 2 times a day.   spironolactone 25 mg tablet; Commonly known as: Aldactone; Take 0.5   tablets (12.5 mg) by mouth once daily.   ticagrelor 90 mg tablet; Commonly known as: Brilinta; Take 1 tablet (90   mg) by mouth 2 times a day.     CHANGE how you take these medications     atorvastatin 40 mg tablet; Commonly known as: Lipitor; Take 1 tablet (40   mg) by mouth once daily at bedtime.; What changed: medication strength,   how much to take, when to take this     STOP taking these medications     apixaban 5 mg tablet; Commonly known as: Eliquis       Test Results Pending At Discharge  Pending Labs       No current pending labs.            Hospital Course  Tenzin Bates is a 74 y.o. 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     Patient says over the past week he's been experiencing central chest pain worse with exertion and with coughing, pain has been getting worse with time. Cough was non productive and not associated with fever, chills or hemoptysis. Patient admited to b/l feet swelling worse on the left over the past week as well. Patient says over the past week, he has been driving from  [place] to [place] to see his wife who has been admitted to . He was on apixaban for a previous DVT/PE but stopped about 8 weeks ago on the advise on his doctor. A CTA done on Feb 2, 2025 showed no PEs. He admited to a positive Fhx of DVT in her mother and denies smoking, alcohol or recreational drug use. He denied dizziness, palpitations, orthopnea, or PND. Patient reported not taking any chronic medications except for some vitamins.    ED Course:  Triage vitals:  T 36.4 °C (97.5 °F)  HR 98  /74  RR 16  O2 96 % None (Room air)     ED labs:  CBC Wcc 8.4, hb 16.1, plt 170  CMP, wnl except AST 52  , D-dimer 897,  Trop 8378-->7904     EKG: without ischemic changes  CXR: No acute cardiopulmonary findings  POCUS: Decreased LV systolic function    Urgent LHC showed RCA with total thrombotic occlusion originating at the proximal to mid segment. Now Now s/p PCI to RCA. Admitted to the CICU given concern for thrombotic burden during PCI. He was continued on heparin gtt for additional 24 hours and transferred to the floor on 5/24.    On the floor he remained without CP or SOB. DVT US ordered showed no clots in b/l lower extremities. He was ready for discharge on 5/25.    Pertinent Physical Exam At Time of Discharge  Physical Exam  Stable at discharge, see 5/25 progress note    Outpatient Follow-Up  Cardiology follow up TBD      Sanjiv Tan MD  Internal Medicine   PGY1

## 2025-05-26 NOTE — PROGRESS NOTES
Tenzin Bates is a 74 y.o. male on day 2 of admission presenting with NSTEMI (non-ST elevated myocardial infarction) (Multi).    5/26 1012: Spoke with patient and spouse via telephone and they both gave no preference for FOC to provide PT/OT services. They are both agreeable to a FOC that is able to have SOC being as soon as possible be the FOC for the PT/OT and home health aid services.    5/26 1153: TCC spoke with patient and spouse via telephone about facilities able to provide HHC. Thge facility that is able to start the soonest was [facility]. Patient and spouse were agreeable to that agency to provide care and understands SOC is 5/28.     Alana Mullen RN  Transitional Care Coordinator.

## 2025-06-10 LAB
ACT BLD: 172 SEC (ref 89–169)
ACT BLD: 297 SEC (ref 89–169)

## (undated) DEVICE — TR BAND, RADIAL COMPRESSION, STANDARD, 24CM

## (undated) DEVICE — CATHETER, EAGLE EYE, PLATNIUM (IVUS)

## (undated) DEVICE — GUIDEWIRE, RUN THROUGH WIRE, 180CM

## (undated) DEVICE — PAD, ELECTRODE DEFIB PADPRO ADULT STRL W/ADAPTER

## (undated) DEVICE — Device

## (undated) DEVICE — CATHETER, BALLOON DILATION, EUPHORA SEMICOMPLIANT 2.50  X 20 MM X 142CM

## (undated) DEVICE — SHEATH, GLIDESHEATH, SLENDER, 6FR 10CM

## (undated) DEVICE — GUIDEWIRE, INQWIRE, 3MM J, .035, 260

## (undated) DEVICE — ESSENTIALS KIT, FOR INFLATION DEVICE

## (undated) DEVICE — INFLATION KIT, ADVANTAGE, ENCORE 26 (1/BOX)

## (undated) DEVICE — CATHETER, OPTITORQUE, 5FR, TIG, 1H/100CM

## (undated) DEVICE — CATHETER, GUIDING, LAUNCHER, 6FR, JR 4.0

## (undated) DEVICE — VALVE, CONTROL BLEEDBACK